# Patient Record
Sex: FEMALE | Race: WHITE | Employment: OTHER | URBAN - METROPOLITAN AREA
[De-identification: names, ages, dates, MRNs, and addresses within clinical notes are randomized per-mention and may not be internally consistent; named-entity substitution may affect disease eponyms.]

---

## 2017-04-11 ENCOUNTER — OFFICE VISIT (OUTPATIENT)
Dept: CARDIOLOGY CLINIC | Age: 72
End: 2017-04-11

## 2017-04-11 VITALS
DIASTOLIC BLOOD PRESSURE: 42 MMHG | HEART RATE: 55 BPM | SYSTOLIC BLOOD PRESSURE: 93 MMHG | BODY MASS INDEX: 21.53 KG/M2 | WEIGHT: 117 LBS | HEIGHT: 62 IN

## 2017-04-11 DIAGNOSIS — I34.0 NON-RHEUMATIC MITRAL REGURGITATION: Primary | ICD-10-CM

## 2017-04-11 DIAGNOSIS — E78.5 DYSLIPIDEMIA: ICD-10-CM

## 2017-04-11 DIAGNOSIS — I10 ESSENTIAL HYPERTENSION: ICD-10-CM

## 2017-04-11 RX ORDER — SIMVASTATIN 40 MG/1
20 TABLET, FILM COATED ORAL
COMMUNITY

## 2017-04-11 RX ORDER — SUMATRIPTAN 50 MG/1
50 TABLET, FILM COATED ORAL
COMMUNITY

## 2017-04-11 RX ORDER — NADOLOL 40 MG/1
TABLET ORAL DAILY
COMMUNITY

## 2017-04-11 NOTE — LETTER
Cyndi Vasiliy 1945 4/11/2017 Dear Janel Thompson MD 
 
I had the pleasure of evaluating  Ms. Jin Munoz in office today. Below are the relevant portions of my assessment and plan of care. ICD-10-CM ICD-9-CM 1. Non-rheumatic mitral regurgitation I34.0 424.0 2D ECHO COMPLETE ADULT (TTE) 2. Essential hypertension I10 401.9 3. Dyslipidemia E78.5 272.4 Current Outpatient Prescriptions Medication Sig Dispense Refill  simvastatin (ZOCOR) 40 mg tablet Take  by mouth nightly.  nadolol (CORGARD) 40 mg tablet Take  by mouth daily.  SUMAtriptan (IMITREX) 50 mg tablet Take 50 mg by mouth once as needed for Migraine. Orders Placed This Encounter  2D ECHO COMPLETE ADULT (TTE) Standing Status:   Future Standing Expiration Date:   10/11/2017 Order Specific Question:   Reason for Exam: Answer:   mitral regurgitation  simvastatin (ZOCOR) 40 mg tablet Sig: Take  by mouth nightly.  nadolol (CORGARD) 40 mg tablet Sig: Take  by mouth daily.  SUMAtriptan (IMITREX) 50 mg tablet Sig: Take 50 mg by mouth once as needed for Migraine. If you have questions, please do not hesitate to call me. I look forward to following Ms. Jin Munoz along with you.  
 
Sincerely, 
Emily Yoon MD

## 2017-04-11 NOTE — PROGRESS NOTES
HISTORY OF PRESENT ILLNESS  Ever Elders is a 73 y.o. female. New Patient   The history is provided by the patient. This is a chronic problem. The problem occurs every several days. The problem has not changed since onset. Pertinent negatives include no chest pain, no abdominal pain, no headaches and no shortness of breath. Valvular Heart Disease   The history is provided by the patient. This is a chronic problem. The problem occurs every several days. The problem has not changed since onset. Pertinent negatives include no chest pain, no abdominal pain, no headaches and no shortness of breath. Review of Systems   Constitutional: Negative for chills, diaphoresis, fever, malaise/fatigue and weight loss. HENT: Negative for ear discharge, ear pain, hearing loss, nosebleeds and tinnitus. Eyes: Negative for blurred vision. Respiratory: Negative for cough, hemoptysis, sputum production, shortness of breath, wheezing and stridor. Cardiovascular: Negative for chest pain, palpitations, orthopnea, claudication, leg swelling and PND. Gastrointestinal: Negative for abdominal pain, heartburn, nausea and vomiting. Musculoskeletal: Negative for myalgias and neck pain. Skin: Negative for itching and rash. Neurological: Negative for dizziness, tingling, tremors, focal weakness, loss of consciousness, weakness and headaches. Psychiatric/Behavioral: Negative for depression and suicidal ideas.      Family History   Problem Relation Age of Onset    Breast Cancer Mother 80    Hypertension Mother     Heart Surgery Mother     Hypertension Father        Past Medical History:   Diagnosis Date    Breast cancer ([facility] [address] lt mastectomy '92    per pt       Past Surgical History:   Procedure Laterality Date    HX BREAST AUGMENTATION  rt implant '89       Social History   Substance Use Topics    Smoking status: Never Smoker    Smokeless tobacco: Not on file    Alcohol use No       Allergies   Allergen Reactions    Diflucan [Fluconazole] Swelling     Mouth swelling    Codeine Nausea and Vomiting    Sulfa (Sulfonamide Antibiotics) Rash       Outpatient Prescriptions Marked as Taking for the 4/11/17 encounter (Office Visit) with Kamran Seymour MD   Medication Sig Dispense Refill    simvastatin (ZOCOR) 40 mg tablet Take  by mouth nightly.  nadolol (CORGARD) 40 mg tablet Take  by mouth daily.  SUMAtriptan (IMITREX) 50 mg tablet Take 50 mg by mouth once as needed for Migraine. Visit Vitals    BP 93/42    Pulse (!) 55    Ht 5' 2\" (1.575 m)    Wt 53.1 kg (117 lb)    BMI 21.4 kg/m2     Physical Exam   Constitutional: She is oriented to person, place, and time. She appears well-developed and well-nourished. No distress. HENT:   Head: Atraumatic. Mouth/Throat: No oropharyngeal exudate. Eyes: Conjunctivae are normal. Right eye exhibits no discharge. Left eye exhibits no discharge. No scleral icterus. Neck: Normal range of motion. Neck supple. No JVD present. No tracheal deviation present. No thyromegaly present. Cardiovascular: Normal rate and regular rhythm. Exam reveals no gallop. Murmur (2/6 holosystolic blowing murmur best heard at apex with radiation to axilla) heard. Pulmonary/Chest: Effort normal and breath sounds normal. No stridor. No respiratory distress. She has no wheezes. She has no rales. She exhibits no tenderness. Abdominal: Soft. There is no tenderness. There is no rebound and no guarding. Musculoskeletal: Normal range of motion. She exhibits no edema or tenderness. Lymphadenopathy:     She has no cervical adenopathy. Neurological: She is alert and oriented to person, place, and time. She exhibits normal muscle tone. Skin: Skin is warm. She is not diaphoretic. Psychiatric: She has a normal mood and affect.  Her behavior is normal.     ekg sinus rhythm with no acute st-t changes  ASSESSMENT and PLAN    ICD-10-CM ICD-9-CM    1. Non-rheumatic mitral regurgitation I34.0 424.0 2D ECHO COMPLETE ADULT (TTE)   2. Essential hypertension I10 401.9    3. Dyslipidemia E78.5 272.4      Orders Placed This Encounter    2D ECHO COMPLETE ADULT (TTE)     Standing Status:   Future     Standing Expiration Date:   10/11/2017     Order Specific Question:   Reason for Exam:     Answer:   mitral regurgitation     Follow-up Disposition:  Return in about 2 weeks (around 4/25/2017). current treatment plan is effective, no change in therapy  cardiovascular risk and specific lipid/LDL goals reviewed. 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 She claims to be asymptomatic with good ET. Will proceed with echo to assess LV function/MR. If needed, will proceed with ETT.

## 2017-04-11 NOTE — MR AVS SNAPSHOT
Visit Information Date & Time Provider Department Dept. Phone Encounter #  
 4/11/2017 11:45 AM Kamran Seymour MD [facility] [address] [place] [address] [id number] Follow-up Instructions Return in about 2 weeks (around 4/25/2017). Your Appointments 4/28/2017 10:00 AM  
PROCEDURE with CA ECHO [facility] ([facility]) Appt Note: saturnino; [employer] [address], [address] [facility] [address]  
[address], [address] [place] [address]  
  
    
 5/2/2017 10:00 AM  
ESTABLISHED PATIENT with Kamran Seymour MD  
[facility]) Appt Note: post echo [address], [address] [facility] [address]  
[address], [address] Upcoming Health Maintenance Date Due Hepatitis C Screening 1945 DTaP/Tdap/Td series (1 - Tdap) 9/1/1968 FOBT Q 1 YEAR AGE 50-75 9/1/1997 ZOSTER VACCINE AGE 60> 9/1/2007 GLAUCOMA SCREENING Q2Y 9/1/2012 OSTEOPOROSIS SCREENING (DEXA) 9/1/2012 Pneumococcal 65+ Low/Medium Risk (1 of 2 - PCV13) 9/1/2012 MEDICARE YEARLY EXAM 9/1/2012 BREAST CANCER SCRN MAMMOGRAM 8/20/2014 INFLUENZA AGE 9 TO ADULT 8/1/2016 Allergies as of 4/11/2017  Review Complete On: 4/11/2017 By: Angie Pierre LPN Severity Noted Reaction Type Reactions Diflucan [Fluconazole] High 06/10/2014   Systemic Swelling Mouth swelling Codeine Low 06/10/2014   Side Effect Nausea and Vomiting  
 Sulfa (Sulfonamide Antibiotics) Low 06/10/2014   Topical Rash Current Immunizations  Never Reviewed No immunizations on file. Not reviewed this visit You Were Diagnosed With   
  
 Codes Comments Non-rheumatic mitral regurgitation    -  Primary ICD-10-CM: I34.0 ICD-9-CM: 424.0 Essential hypertension     ICD-10-CM: I10 
ICD-9-CM: 401.9 Dyslipidemia     ICD-10-CM: E78.5 ICD-9-CM: 272.4 Vitals BP Pulse Height(growth percentile) Weight(growth percentile) BMI Smoking Status 93/42 (!) 55 5' 2\" (1.575 m) 117 lb (53.1 kg) 21.4 kg/m2 Never Smoker Vitals History BMI and BSA Data Body Mass Index Body Surface Area  
 21.4 kg/m 2 1.52 m 2 Your Updated Medication List  
  
   
This list is accurate as of: 4/11/17 12:41 PM.  Always use your most recent med list.  
  
  
  
  
 IMITREX 50 mg tablet Generic drug:  SUMAtriptan Take 50 mg by mouth once as needed for Migraine. nadolol 40 mg tablet Commonly known as:  CORGARD Take  by mouth daily. ZOCOR 40 mg tablet Generic drug:  simvastatin Take  by mouth nightly. Follow-up Instructions Return in about 2 weeks (around 4/25/2017). To-Do List   
 04/25/2017 Cardiac Services:  2D ECHO COMPLETE ADULT (TTE) Introducing [facility] & HEALTH SERVICES! Jian Us introduces [email or web address] patient portal. Now you can access parts of your medical record, email your doctor's office, and request medication refills online. 1. In your internet browser, go to [email or web address] 2. Click on the First Time User? Click Here link in the Sign In box. You will see the New Member Sign Up page. 3. Enter your [email or web address] Access Code exactly as it appears below. You will not need to use this code after youve completed the sign-up process. If you do not sign up before the expiration date, you must request a new code. · [email or web address] Access Code: [email or web address] Expires: 7/10/2017 11:55 AM 
 
4. Enter the last four digits of your Social Security Number (xxxx) and Date of Birth (mm/dd/yyyy) as indicated and click Submit. You will be taken to the next sign-up page. 5. Create a BeVocalt ID. This will be your BeVocalt login ID and cannot be changed, so think of one that is secure and easy to remember. 6. Create a BeVocalt password. You can change your password at any time. 7. Enter your Password Reset Question and Answer. This can be used at a later time if you forget your password. 8. Enter your e-mail address. You will receive e-mail notification when new information is available in [address] 9. Click Sign Up. You can now view and download portions of your medical record. 10. Click the Download Summary menu link to download a portable copy of your medical information. If you have questions, please visit the Frequently Asked Questions section of the Vivid [employer] website. Remember, V[employer] is NOT to be used for urgent needs. For medical emergencies, dial 911. Now available from your iPhone and Android! Please provide this summary of care documentation to your next provider. Your primary care clinician is listed as Erwin Jimenez. If you have any questions after today's visit, please call [phone].

## 2017-04-28 ENCOUNTER — CLINICAL SUPPORT (OUTPATIENT)
Dept: CARDIOLOGY CLINIC | Age: 72
End: 2017-04-28

## 2017-04-28 DIAGNOSIS — I34.0 NON-RHEUMATIC MITRAL REGURGITATION: ICD-10-CM

## 2017-05-02 ENCOUNTER — OFFICE VISIT (OUTPATIENT)
Dept: CARDIOLOGY CLINIC | Age: 72
End: 2017-05-02

## 2017-05-02 VITALS
BODY MASS INDEX: 21.53 KG/M2 | WEIGHT: 117 LBS | SYSTOLIC BLOOD PRESSURE: 97 MMHG | DIASTOLIC BLOOD PRESSURE: 48 MMHG | HEART RATE: 59 BPM | HEIGHT: 62 IN

## 2017-05-02 DIAGNOSIS — I10 ESSENTIAL HYPERTENSION: ICD-10-CM

## 2017-05-02 DIAGNOSIS — I34.0 NON-RHEUMATIC MITRAL REGURGITATION: Primary | ICD-10-CM

## 2017-05-02 DIAGNOSIS — E78.5 DYSLIPIDEMIA: ICD-10-CM

## 2017-05-02 DIAGNOSIS — I34.1 MITRAL VALVE PROLAPSE: ICD-10-CM

## 2017-05-02 RX ORDER — TRAZODONE HYDROCHLORIDE 50 MG/1
TABLET ORAL
Status: ON HOLD | COMMUNITY
End: 2018-09-20

## 2017-05-02 RX ORDER — DULOXETIN HYDROCHLORIDE 30 MG/1
30 CAPSULE, DELAYED RELEASE ORAL DAILY
COMMUNITY

## 2017-05-02 NOTE — PROGRESS NOTES
HISTORY OF PRESENT ILLNESS  Vlad Griffin is a 73 y.o. female. Valvular Heart Disease   The history is provided by the patient. This is a chronic problem. The problem occurs every several days. The problem has not changed since onset. Pertinent negatives include no chest pain and no shortness of breath. Review of Systems   Constitutional: Negative for chills, diaphoresis, fever, malaise/fatigue and weight loss. HENT: Negative for ear discharge, ear pain, hearing loss, nosebleeds and tinnitus. Eyes: Negative for blurred vision. Respiratory: Negative for cough, hemoptysis, sputum production, shortness of breath, wheezing and stridor. Cardiovascular: Negative for chest pain, palpitations, orthopnea, claudication, leg swelling and PND. Gastrointestinal: Negative for heartburn, nausea and vomiting. Musculoskeletal: Negative for myalgias and neck pain. Skin: Negative for itching and rash. Neurological: Negative for dizziness, tingling, tremors, focal weakness, loss of consciousness and weakness. Psychiatric/Behavioral: Negative for depression and suicidal ideas.      Family History   Problem Relation Age of Onset    Breast Cancer Mother 80    Hypertension Mother     Heart Surgery Mother     Hypertension Father        Past Medical History:   Diagnosis Date    Breast cancer ([facility] [address] lt mastectomy '92    per pt       Past Surgical History:   Procedure Laterality Date    HX BREAST AUGMENTATION  rt implant '89       Social History   Substance Use Topics    Smoking status: Never Smoker    Smokeless tobacco: Not on file    Alcohol use No       Allergies   Allergen Reactions    Diflucan [Fluconazole] Swelling     Mouth swelling    Codeine Nausea and Vomiting    Sulfa (Sulfonamide Antibiotics) Rash       Outpatient Prescriptions Marked as Taking for the 5/2/17 encounter (Office Visit) with Chito Mallory MD   Medication Sig Dispense Refill    traZODone (DESYREL) 50 mg tablet Take  by mouth nightly.  DULoxetine (CYMBALTA) 30 mg capsule Take 30 mg by mouth daily.  BUTALB/ACETAMINOPHEN/CAFFEINE (FIORICET PO) Take  by mouth.  simvastatin (ZOCOR) 40 mg tablet Take  by mouth nightly.  nadolol (CORGARD) 40 mg tablet Take  by mouth daily.  SUMAtriptan (IMITREX) 50 mg tablet Take 50 mg by mouth once as needed for Migraine. Visit Vitals    BP 97/48    Pulse (!) 59    Ht 5' 2\" (1.575 m)    Wt 53.1 kg (117 lb)    BMI 21.4 kg/m2     Physical Exam   Constitutional: She is oriented to person, place, and time. She appears well-developed and well-nourished. No distress. HENT:   Head: Atraumatic. Mouth/Throat: No oropharyngeal exudate. Eyes: Conjunctivae are normal. Right eye exhibits no discharge. Left eye exhibits no discharge. No scleral icterus. Neck: Normal range of motion. Neck supple. No JVD present. No tracheal deviation present. No thyromegaly present. Cardiovascular: Normal rate and regular rhythm. Exam reveals no gallop. Murmur (2/6 holosystolic blowing murmur best heard at apex with radiation to axilla) heard. Pulmonary/Chest: Effort normal and breath sounds normal. No stridor. No respiratory distress. She has no wheezes. She has no rales. She exhibits no tenderness. Abdominal: Soft. There is no tenderness. There is no rebound and no guarding. Musculoskeletal: Normal range of motion. She exhibits no edema or tenderness. Lymphadenopathy:     She has no cervical adenopathy. Neurological: She is alert and oriented to person, place, and time. She exhibits normal muscle tone. Skin: Skin is warm. She is not diaphoretic. Psychiatric: She has a normal mood and affect. Her behavior is normal.     ekg sinus rhythm with no acute st-t changes. Echo 04/2017:  SUMMARY:  Left ventricle: Systolic function was normal. Ejection fraction was  estimated in the range of 55 % to 60 %. There were no regional wall motion  abnormalities.     Left atrium: The atrium was moderately dilated. Mitral valve: There was mild annular calcification. There was a moderate  prolapse involving the anterior and posterior leaflets. There was moderate  to severe regurgitation. The effective orifice of mitral regurgitation by  proximal isovelocity surface area was 0.13 cmï¾². The volume of mitral  regurgitation by proximal isovelocity surface area was 23 ml. ASSESSMENT and PLAN    ICD-10-CM ICD-9-CM    1. Non-rheumatic mitral regurgitation I34.0 424.0 STRESS TEST CARDIAC    moderate to severe   2. Essential hypertension I10 401.9    3. Dyslipidemia E78.5 272.4    4. Mitral valve prolapse I34.1 424.0      Orders Placed This Encounter    STRESS TEST CARDIAC     Standing Status:   Future     Standing Expiration Date:   11/2/2017     Order Specific Question:   Reason for Exam:     Answer:   mitral regurgitation     Follow-up Disposition:  Return in about 3 weeks (around 5/23/2017). current treatment plan is effective, no change in therapy  cardiovascular risk and specific lipid/LDL goals reviewed. 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 She claims to be asymptomatic with good ET. Echo report reviewed with patient- will proceed with ETT to evaluate exercise tolerance.

## 2017-05-02 NOTE — MR AVS SNAPSHOT
Visit Information Date & Time Provider Department Dept. Phone Encounter #  
 5/2/2017 10:00 AM Bakari Xie MD Cardiology [facility] [address] HEATHER RAMÍREZ [phone] [id number] Follow-up Instructions Return in about 3 weeks (around 5/23/2017). Follow-up and Disposition History Upcoming Health Maintenance Date Due Hepatitis C Screening 1945 DTaP/Tdap/Td series (1 - Tdap) 9/1/1968 FOBT Q 1 YEAR AGE 50-75 9/1/1997 ZOSTER VACCINE AGE 60> 9/1/2007 GLAUCOMA SCREENING Q2Y 9/1/2012 OSTEOPOROSIS SCREENING (DEXA) 9/1/2012 Pneumococcal 65+ Low/Medium Risk (1 of 2 - PCV13) 9/1/2012 MEDICARE YEARLY EXAM 9/1/2012 BREAST CANCER SCRN MAMMOGRAM 8/20/2014 INFLUENZA AGE 9 TO ADULT 8/1/2017 Allergies as of 5/2/2017  Review Complete On: 5/2/2017 By: Bakari Xie MD  
  
 Severity Noted Reaction Type Reactions Diflucan [Fluconazole] High 06/10/2014   Systemic Swelling Mouth swelling Codeine Low 06/10/2014   Side Effect Nausea and Vomiting  
 Sulfa (Sulfonamide Antibiotics) Low 06/10/2014   Topical Rash Current Immunizations  Never Reviewed No immunizations on file. Not reviewed this visit You Were Diagnosed With   
  
 Codes Comments Non-rheumatic mitral regurgitation    -  Primary ICD-10-CM: I34.0 ICD-9-CM: 424.0 moderate to severe Essential hypertension     ICD-10-CM: I10 
ICD-9-CM: 401.9 Dyslipidemia     ICD-10-CM: E78.5 ICD-9-CM: 272.4 Mitral valve prolapse     ICD-10-CM: I34.1 ICD-9-CM: 424.0 Vitals BP Pulse Height(growth percentile) Weight(growth percentile) BMI Smoking Status 97/48 (!) 59 5' 2\" (1.575 m) 117 lb (53.1 kg) 21.4 kg/m2 Never Smoker Vitals History BMI and BSA Data Body Mass Index Body Surface Area  
 21.4 kg/m 2 1.52 m 2 Your Updated Medication List  
  
   
This list is accurate as of: 5/2/17 11:11 AM.  Always use your most recent med list.  
  
  
  
  
 CYMBALTA 30 mg capsule Generic drug:  DULoxetine Take 30 mg by mouth daily. FIORICET PO Take  by mouth. IMITREX 50 mg tablet Generic drug:  SUMAtriptan Take 50 mg by mouth once as needed for Migraine. nadolol 40 mg tablet Commonly known as:  CORGARD Take  by mouth daily. traZODone 50 mg tablet Commonly known as:  Adan Cushing Take  by mouth nightly. ZOCOR 40 mg tablet Generic drug:  simvastatin Take  by mouth nightly. Follow-up Instructions Return in about 3 weeks (around 5/23/2017). To-Do List   
 05/23/2017 ECG:  STRESS TEST CARDIAC Introducing [facility]! Katie Brandy introduces [employer] patient portal. Now you can access parts of your medical record, email your doctor's office, and request medication refills online. 1. In your internet browser, go to [email or web address] 2. Click on the First Time User? Click Here link in the Sign In box. You will see the New Member Sign Up page. 3. Enter your [employer] Access Code exactly as it appears below. You will not need to use this code after youve completed the sign-up process. If you do not sign up before the expiration date, you must request a new code. · [employer] Access Code: [email or web address] Expires: 7/10/2017 11:55 AM 
 
4. Enter the last four digits of your Social Security Number (xxxx) and Date of Birth (mm/dd/yyyy) as indicated and click Submit. You will be taken to the next sign-up page. 5. Create a Bohemian Guitarst ID. This will be your Flo Water login ID and cannot be changed, so think of one that is secure and easy to remember. 6. Create a Flo Water password. You can change your password at any time. 7. Enter your Password Reset Question and Answer. This can be used at a later time if you forget your password. 8. Enter your e-mail address. You will receive e-mail notification when new information is available in [address] 9. Click Sign Up. You can now view and download portions of your medical record. 10. Click the Download Summary menu link to download a portable copy of your medical information. If you have questions, please visit the Frequently Asked Questions section of the [employer] website. Remember, [employer] is NOT to be used for urgent needs. For medical emergencies, dial 911. Now available from your iPhone and Android! Please provide this summary of care documentation to your next provider. Your primary care clinician is listed as Ede Woodward. If you have any questions after today's visit, please call [phone].

## 2017-05-02 NOTE — PROGRESS NOTES
4/28/17  Echocardiogram     SUMMARY:  Left ventricle: Systolic function was normal. Ejection fraction was  estimated in the range of 55 % to 60 %. There were no regional wall motion  abnormalities. Left atrium: The atrium was moderately dilated. Mitral valve: There was mild annular calcification. There was a moderate  prolapse involving the anterior and posterior leaflets. There was moderate  to severe regurgitation. The effective orifice of mitral regurgitation by  proximal isovelocity surface area was 0.13 cmï¾². The volume of mitral  regurgitation by proximal isovelocity surface area was 23 ml. Tricuspid valve: There was mild regurgitation. Pulmonic valve: There was mild regurgitation. COMPARISONS:  Comparison was made with the previous study of 28-Oct-2015. Mitral  regurgitation has improved slightly from severe to moderate-severe. Left  atrium has increased in size. 1. Have you been to the ER, urgent care clinic since your last visit? Hospitalized since your last visit? NO    2. Have you seen or consulted any other health care providers outside of the [address] [place] [address] since your last visit? Include any pap smears or colon screening. NO    3. Since your last visit, have you had any of the following symptoms? None   4. Have you had any blood work, X-rays or cardiac testing? None     5. Where do you normally have your labs drawn? pcp     6. Do you need any refills today?  No  Medications confirmed verbally by patient

## 2017-05-02 NOTE — LETTER
Geovany Ángel 1945 5/2/2017 Dear Isidra Healy MD 
 
I had the pleasure of evaluating  Ms. Dante Lopez in office today. Below are the relevant portions of my assessment and plan of care. ICD-10-CM ICD-9-CM 1. Non-rheumatic mitral regurgitation I34.0 424.0 STRESS TEST CARDIAC  
 moderate to severe 2. Essential hypertension I10 401.9 3. Dyslipidemia E78.5 272.4 4. Mitral valve prolapse I34.1 424.0 Current Outpatient Prescriptions Medication Sig Dispense Refill  traZODone (DESYREL) 50 mg tablet Take  by mouth nightly.  DULoxetine (CYMBALTA) 30 mg capsule Take 30 mg by mouth daily.  BUTALB/ACETAMINOPHEN/CAFFEINE (FIORICET PO) Take  by mouth.  simvastatin (ZOCOR) 40 mg tablet Take  by mouth nightly.  nadolol (CORGARD) 40 mg tablet Take  by mouth daily.  SUMAtriptan (IMITREX) 50 mg tablet Take 50 mg by mouth once as needed for Migraine. Orders Placed This Encounter  STRESS TEST CARDIAC Standing Status:   Future Standing Expiration Date:   11/2/2017 Order Specific Question:   Reason for Exam: Answer:   mitral regurgitation  traZODone (DESYREL) 50 mg tablet Sig: Take  by mouth nightly.  DULoxetine (CYMBALTA) 30 mg capsule Sig: Take 30 mg by mouth daily.  BUTALB/ACETAMINOPHEN/CAFFEINE (FIORICET PO) Sig: Take  by mouth. If you have questions, please do not hesitate to call me. I look forward to following Ms. Dante Lopez along with you.  
 
Sincerely, 
Verónica Toribio MD

## 2017-08-11 ENCOUNTER — CLINICAL SUPPORT (OUTPATIENT)
Dept: CARDIOLOGY CLINIC | Age: 72
End: 2017-08-11

## 2017-08-11 DIAGNOSIS — I34.0 NON-RHEUMATIC MITRAL REGURGITATION: ICD-10-CM

## 2017-08-11 NOTE — LETTER
Samantha De La Paz 1945 8/11/2017 Dear Nicolás Mensah MD 
 
I had the pleasure of evaluating  Ms. Jovon Myles in office today. Below are the relevant portions of my assessment and plan of care. ICD-10-CM ICD-9-CM 1. Non-rheumatic mitral regurgitation I34.0 424.0 AMB POC CARDIAC STRESS TST,COMPLETE  
   CANCELED: STRESS TEST CARDIAC  
 moderate to severe Current Outpatient Prescriptions Medication Sig Dispense Refill  traZODone (DESYREL) 50 mg tablet Take  by mouth nightly.  DULoxetine (CYMBALTA) 30 mg capsule Take 30 mg by mouth daily.  BUTALB/ACETAMINOPHEN/CAFFEINE (FIORICET PO) Take  by mouth.  simvastatin (ZOCOR) 40 mg tablet Take  by mouth nightly.  nadolol (CORGARD) 40 mg tablet Take  by mouth daily.  SUMAtriptan (IMITREX) 50 mg tablet Take 50 mg by mouth once as needed for Migraine. No orders of the defined types were placed in this encounter. If you have questions, please do not hesitate to call me. I look forward to following Ms. Jovon Myles along with you.  
 
Sincerely, 
Lynn Buenrostro MD

## 2017-08-11 NOTE — PROGRESS NOTES
[facility], [address], [address] [place] [address], [address] [place] [address]          Treadmill  Stress Test      Name: Tiffanie Bradley    Age: 73 y.o. Gender: female  YOB: 1945   Date of Rest/Stress Images: 8/11/2017   Diagnosis: No diagnosis found. Referring Provider: Jennifer Esparza MD  Ordering Provider: Dr. Sunday Ochoa          Baseline:   Heart rate 78. Blood pressure 138/76. EKG shows sinus rhythm, normal axis, nonspecific ST-T changes. Exercise data:  Patient exercised using standard Jayme protocol. Patient exercised for a total of 6 minutes and 33 seconds achieving 7.0 METS. Exercise was stopped due to fatigue at patient's request.  Max heart rate is 142 which is 94 of predicted maximal.  Max BP is 160/66. EKG has no ST-T changes by standard criteria. Conclusions :  1. No ischemic ST-T changes up to 94% of predicted maximum heart rate. 2.  Normal functional capacity. 3.  No symptoms or arrhythmias with exercise. 4.  Appropriate heart rate and blood pressure response. 5.  Clinical correlation is suggested.         Thank you for the referral.    Interpreting Physician:  Dr. Sunday Ochoa    8/11/2017

## 2017-08-11 NOTE — MR AVS SNAPSHOT
Visit Information Date & Time Provider Department Dept. Phone Encounter #  
 8/11/2017  1:45 PM Jaime Dodson MD [facility] [address] [place] [address] [id number] Follow-up Instructions Return in about 4 months (around 12/11/2017). Your Appointments 8/11/2017  1:45 PM  
PROCEDURE with Jaime Dodson MD  
[facility]) Appt Note: post nate; stress test with f/u; swp; stress test with f/u l/m on machine; stress test with f/u 11:30am  
 [address] [place] [address]  
[address], [address] [facility]  
  
    
 12/12/2017 11:45 AM  
Office Visit with Jaime Dodson MD  
[facility]) Appt Note: 4 m [address] [place] [address]  
[address], [address] Upcoming Health Maintenance Date Due Hepatitis C Screening 1945 DTaP/Tdap/Td series (1 - Tdap) 9/1/1968 FOBT Q 1 YEAR AGE 50-75 9/1/1997 ZOSTER VACCINE AGE 60> 7/1/2007 GLAUCOMA SCREENING Q2Y 9/1/2012 OSTEOPOROSIS SCREENING (DEXA) 9/1/2012 Pneumococcal 65+ Low/Medium Risk (1 of 2 - PCV13) 9/1/2012 MEDICARE YEARLY EXAM 9/1/2012 BREAST CANCER SCRN MAMMOGRAM 8/20/2014 INFLUENZA AGE 9 TO ADULT 8/1/2017 Allergies as of 8/11/2017  Review Complete On: 8/11/2017 By: Enrike Vargas LPN Severity Noted Reaction Type Reactions Diflucan [Fluconazole] High 06/10/2014   Systemic Swelling Mouth swelling Codeine Low 06/10/2014   Side Effect Nausea and Vomiting  
 Sulfa (Sulfonamide Antibiotics) Low 06/10/2014   Topical Rash Current Immunizations  Never Reviewed No immunizations on file. Not reviewed this visit You Were Diagnosed With   
  
 Codes Comments Non-rheumatic mitral regurgitation     ICD-10-CM: I34.0 ICD-9-CM: 424.0 moderate to severe Vitals Smoking Status Never Smoker Your Updated Medication List  
  
   
This list is accurate as of: 8/11/17  1:06 PM.  Always use your most recent med list.  
  
  
  
  
 CYMBALTA 30 mg capsule Generic drug:  DULoxetine Take 30 mg by mouth daily. FIORICET PO Take  by mouth. IMITREX 50 mg tablet Generic drug:  SUMAtriptan Take 50 mg by mouth once as needed for Migraine. nadolol 40 mg tablet Commonly known as:  CORGARD Take  by mouth daily. traZODone 50 mg tablet Commonly known as:  [place] Odor Take  by mouth nightly. ZOCOR 40 mg tablet Generic drug:  simvastatin Take  by mouth nightly. Follow-up Instructions Return in about 4 months (around 12/11/2017). Introducing [facility] & HEALTH SERVICES! [facility] introduces [employer] patient portal. Now you can access parts of your medical record, email your doctor's office, and request medication refills online. 1. In your internet browser, go to [email or web address] 2. Click on the First Time User? Click Here link in the Sign In box. You will see the New Member Sign Up page. 3. Enter your [employer] Access Code exactly as it appears below. You will not need to use this code after youve completed the sign-up process. If you do not sign up before the expiration date, you must request a new code. · [employer] Access Code: [email or web address] Expires: 11/9/2017  1:06 PM 
 
4. Enter the last four digits of your Social Security Number (xxxx) and Date of Birth (mm/dd/yyyy) as indicated and click Submit. You will be taken to the next sign-up page. 5. Create a [employer] ID. This will be your [employer] login ID and cannot be changed, so think of one that is secure and easy to remember. 6. Create a [employer] password. You can change your password at any time. 7. Enter your Password Reset Question and Answer.  This can be used at a later time if you forget your password. 8. Enter your e-mail address. You will receive e-mail notification when new information is available in [address] 9. Click Sign Up. You can now view and download portions of your medical record. 10. Click the Download Summary menu link to download a portable copy of your medical information. If you have questions, please visit the Frequently Asked Questions section of the [employer] website. Remember, [employer] is NOT to be used for urgent needs. For medical emergencies, dial 911. Now available from your iPhone and Android! Please provide this summary of care documentation to your next provider. Your primary care clinician is listed as Rao De La Torre. If you have any questions after today's visit, please call [phone].

## 2018-07-12 ENCOUNTER — OFFICE VISIT (OUTPATIENT)
Dept: CARDIOLOGY CLINIC | Age: 73
End: 2018-07-12

## 2018-07-12 VITALS
HEIGHT: 62 IN | HEART RATE: 56 BPM | SYSTOLIC BLOOD PRESSURE: 115 MMHG | BODY MASS INDEX: 22.45 KG/M2 | DIASTOLIC BLOOD PRESSURE: 52 MMHG | WEIGHT: 122 LBS

## 2018-07-12 DIAGNOSIS — I34.1 MITRAL VALVE PROLAPSE: ICD-10-CM

## 2018-07-12 DIAGNOSIS — I10 ESSENTIAL HYPERTENSION: ICD-10-CM

## 2018-07-12 DIAGNOSIS — I34.0 NON-RHEUMATIC MITRAL REGURGITATION: Primary | ICD-10-CM

## 2018-07-12 DIAGNOSIS — E78.5 DYSLIPIDEMIA: ICD-10-CM

## 2018-07-12 RX ORDER — ZOLPIDEM TARTRATE 5 MG/1
2.5 TABLET ORAL
COMMUNITY

## 2018-07-12 NOTE — PROGRESS NOTES
1. Have you been to the ER, urgent care clinic since your last visit? Hospitalized since your last visit?     no  2. Have you seen or consulted any other health care providers outside of the [address] [place] [address] since your last visit? Include any pap smears or colon screening. Yes Where: pcp     3. Since your last visit, have you had any of the following symptoms?      dizziness.

## 2018-07-12 NOTE — MR AVS SNAPSHOT
[address] [facility] 
 
 
 [address] [facility], [address] [facility] [address] 
[phone] Patient: Jannet Bingham MRN: [id number] PWL:[birth year] Visit Information Date & Time Provider Department Dept. Phone Encounter #  
 7/12/2018  1:15 PM Samuel Khan MD [facility] [address] [place] [address] [id number] Follow-up Instructions Return in about 2 months (around 9/12/2018). Follow-up and Disposition History Your Appointments 7/18/2018 10:45 AM  
PROCEDURE with [place] E[facility] [place] (Stacy Ruiz) Appt Note: echo saturnino [address] [facility], [address] [facility] [address]  
[phone]  
  
   
 [address] [facility], [address] [place] [address] Road [address]  
  
    
 9/6/2018  1:30 PM  
PROCEDURE with Samuel Khan MD  
[facility]) Appt Note: 2 m post echo [address] [facility], [address] [facility] [address]  
1338 [facility], [address] Upcoming Health Maintenance Date Due Hepatitis C Screening 1945 DTaP/Tdap/Td series (1 - Tdap) 9/1/1968 FOBT Q 1 YEAR AGE 50-75 9/1/1997 ZOSTER VACCINE AGE 60> 7/1/2007 GLAUCOMA SCREENING Q2Y 9/1/2012 Bone Densitometry (Dexa) Screening 9/1/2012 Pneumococcal 65+ Low/Medium Risk (1 of 2 - PCV13) 9/1/2012 BREAST CANCER SCRN MAMMOGRAM 8/20/2014 Influenza Age 5 to Adult 8/1/2018 Allergies as of 7/12/2018  Review Complete On: 7/12/2018 By: Rashida Dennison Severity Noted Reaction Type Reactions Diflucan [Fluconazole] High 06/10/2014   Systemic Swelling Mouth swelling Codeine Low 06/10/2014   Side Effect Nausea and Vomiting  
 Sulfa (Sulfonamide Antibiotics) Low 06/10/2014   Topical Rash Current Immunizations  Never Reviewed No immunizations on file. Not reviewed this visit You Were Diagnosed With   
  
 Codes Comments Non-rheumatic mitral regurgitation    -  Primary ICD-10-CM: I34.0 ICD-9-CM: 424.0 Essential hypertension     ICD-10-CM: I10 
ICD-9-CM: 401.9 Dyslipidemia     ICD-10-CM: E78.5 ICD-9-CM: 272.4 Mitral valve prolapse     ICD-10-CM: I34.1 ICD-9-CM: 424.0 Vitals BP Pulse Height(growth percentile) Weight(growth percentile) BMI Smoking Status 115/52 (!) 56 5' 2\" (1.575 m) 122 lb (55.3 kg) 22.31 kg/m2 Never Smoker Vitals History BMI and BSA Data Body Mass Index Body Surface Area  
 22.31 kg/m 2 1.56 m 2 Your Updated Medication List  
  
   
This list is accurate as of 7/12/18  2:06 PM.  Always use your most recent med list.  
  
  
  
  
 AMBIEN 5 mg tablet Generic drug:  zolpidem Take 2.5 mg by mouth nightly as needed for Sleep. CYMBALTA 30 mg capsule Generic drug:  DULoxetine Take 30 mg by mouth daily. FIORICET PO Take  by mouth. IMITREX 50 mg tablet Generic drug:  SUMAtriptan Take 50 mg by mouth once as needed for Migraine. nadolol 40 mg tablet Commonly known as:  CORGARD Take  by mouth daily. traZODone 50 mg tablet Commonly known as:  Rebbecca Bunde Take  by mouth nightly. ZOCOR 40 mg tablet Generic drug:  simvastatin Take 20 mg by mouth nightly. Follow-up Instructions Return in about 2 months (around 9/12/2018). To-Do List   
 08/31/2018 Echocardiography:  ECHO ADULT COMPLETE Introducing [facility] & HEALTH SERVICES! [facility] introduces [employer] patient portal. Now you can access parts of your medical record, email your doctor's office, and request medication refills online. 1. In your internet browser, go to [email or web address] 2. Click on the First Time User? Click Here link in the Sign In box. You will see the New Member Sign Up page. 3. Enter your [employer] Access Code exactly as it appears below.  You will not need to use this code after youve completed the sign-up process. If you do not sign up before the expiration date, you must request a new code. · [employer] Access Code: [email or web address] Expires: 10/10/2018  1:32 PM 
 
4. Enter the last four digits of your Social Security Number (xxxx) and Date of Birth (mm/dd/yyyy) as indicated and click Submit. You will be taken to the next sign-up page. 5. Create a [employer] ID. This will be your [employer] login ID and cannot be changed, so think of one that is secure and easy to remember. 6. Create a [employer] password. You can change your password at any time. 7. Enter your Password Reset Question and Answer. This can be used at a later time if you forget your password. 8. Enter your e-mail address. You will receive e-mail notification when new information is available in [address] 9. Click Sign Up. You can now view and download portions of your medical record. 10. Click the Download Summary menu link to download a portable copy of your medical information. If you have questions, please visit the Frequently Asked Questions section of the [employer] website. Remember, [employer] is NOT to be used for urgent needs. For medical emergencies, dial 911. Now available from your iPhone and Android! Please provide this summary of care documentation to your next provider. Your primary care clinician is listed as PROVIDER UNKNOWN. If you have any questions after today's visit, please call [phone].

## 2018-07-12 NOTE — PROGRESS NOTES
HISTORY OF PRESENT ILLNESS  Win Ramos is a 81 y.o. female. Valvular Heart Disease   The history is provided by the patient. This is a chronic problem. The problem occurs every several days. The problem has not changed since onset. Pertinent negatives include no chest pain and no shortness of breath. Hypertension   Pertinent negatives include no chest pain and no shortness of breath. Review of Systems   Constitutional: Negative for chills, diaphoresis, fever, malaise/fatigue and weight loss. HENT: Negative for ear discharge, ear pain, hearing loss, nosebleeds and tinnitus. Eyes: Negative for blurred vision. Respiratory: Negative for cough, hemoptysis, sputum production, shortness of breath, wheezing and stridor. Cardiovascular: Negative for chest pain, palpitations, orthopnea, claudication, leg swelling and PND. Gastrointestinal: Negative for heartburn, nausea and vomiting. Musculoskeletal: Negative for myalgias and neck pain. Skin: Negative for itching and rash. Neurological: Negative for dizziness, tingling, tremors, focal weakness, loss of consciousness and weakness. Psychiatric/Behavioral: Negative for depression and suicidal ideas.      Family History   Problem Relation Age of Onset    Breast Cancer Mother 80    Hypertension Mother     Heart Surgery Mother     Hypertension Father        Past Medical History:   Diagnosis Date    Breast cancer ([facility] [address] lt mastectomy '92    per pt       Past Surgical History:   Procedure Laterality Date    HX BREAST AUGMENTATION  rt implant '89       Social History   Substance Use Topics    Smoking status: Never Smoker    Smokeless tobacco: Never Used    Alcohol use No       Allergies   Allergen Reactions    Diflucan [Fluconazole] Swelling     Mouth swelling    Codeine Nausea and Vomiting    Sulfa (Sulfonamide Antibiotics) Rash       Outpatient Prescriptions Marked as Taking for the 7/12/18 encounter (Office Visit) with Samuel Adames MD   Medication Sig Dispense Refill    zolpidem (AMBIEN) 5 mg tablet Take 2.5 mg by mouth nightly as needed for Sleep.  DULoxetine (CYMBALTA) 30 mg capsule Take 30 mg by mouth daily.  simvastatin (ZOCOR) 40 mg tablet Take 20 mg by mouth nightly.  nadolol (CORGARD) 40 mg tablet Take  by mouth daily.  SUMAtriptan (IMITREX) 50 mg tablet Take 50 mg by mouth once as needed for Migraine. Visit Vitals    /52    Pulse (!) 56    Ht 5' 2\" (1.575 m)    Wt 55.3 kg (122 lb)    BMI 22.31 kg/m2     Physical Exam   Constitutional: She is oriented to person, place, and time. She appears well-developed and well-nourished. No distress. HENT:   Head: Atraumatic. Mouth/Throat: No oropharyngeal exudate. Eyes: Conjunctivae are normal. Right eye exhibits no discharge. Left eye exhibits no discharge. No scleral icterus. Neck: Normal range of motion. Neck supple. No JVD present. No tracheal deviation present. No thyromegaly present. Cardiovascular: Normal rate and regular rhythm. Exam reveals no gallop. Murmur (2/6 holosystolic blowing murmur best heard at apex with radiation to axilla) heard. Pulmonary/Chest: Effort normal and breath sounds normal. No stridor. No respiratory distress. She has no wheezes. She has no rales. She exhibits no tenderness. Abdominal: Soft. There is no tenderness. There is no rebound and no guarding. Musculoskeletal: Normal range of motion. She exhibits no edema or tenderness. Lymphadenopathy:     She has no cervical adenopathy. Neurological: She is alert and oriented to person, place, and time. She exhibits normal muscle tone. Skin: Skin is warm. She is not diaphoretic. Psychiatric: She has a normal mood and affect. Her behavior is normal.     ekg sinus rhythm with no acute st-t changes. Echo 04/2017:  SUMMARY:  Left ventricle: Systolic function was normal. Ejection fraction was  estimated in the range of 55 % to 60 %.  There were no regional wall motion  abnormalities. Left atrium: The atrium was moderately dilated. Mitral valve: There was mild annular calcification. There was a moderate  prolapse involving the anterior and posterior leaflets. There was moderate  to severe regurgitation. The effective orifice of mitral regurgitation by  proximal isovelocity surface area was 0.13 cmï¾². The volume of mitral  regurgitation by proximal isovelocity surface area was 23 ml. ASSESSMENT and PLAN    ICD-10-CM ICD-9-CM    1. Non-rheumatic mitral regurgitation I34.0 424.0 ECHO ADULT COMPLETE   2. Essential hypertension I10 401.9    3. Dyslipidemia E78.5 272.4    4. Mitral valve prolapse I34.1 424.0      No orders of the defined types were placed in this encounter. Follow-up Disposition:  Return in about 2 months (around 9/12/2018). current treatment plan is effective, no change in therapy  cardiovascular risk and specific lipid/LDL goals reviewed. 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 She claims to be asymptomatic with good ET.  ETT done last year- showed good functional capacity.   Will obtain echo to assess VHD  F/u in 2 months

## 2018-09-06 ENCOUNTER — OFFICE VISIT (OUTPATIENT)
Dept: CARDIOLOGY CLINIC | Age: 73
End: 2018-09-06

## 2018-09-06 VITALS
HEART RATE: 63 BPM | DIASTOLIC BLOOD PRESSURE: 54 MMHG | WEIGHT: 122 LBS | HEIGHT: 62 IN | BODY MASS INDEX: 22.45 KG/M2 | SYSTOLIC BLOOD PRESSURE: 103 MMHG

## 2018-09-06 DIAGNOSIS — I34.1 MITRAL VALVE PROLAPSE: Primary | ICD-10-CM

## 2018-09-06 DIAGNOSIS — I10 ESSENTIAL HYPERTENSION: ICD-10-CM

## 2018-09-06 DIAGNOSIS — I34.0 NON-RHEUMATIC MITRAL REGURGITATION: ICD-10-CM

## 2018-09-06 DIAGNOSIS — E78.5 DYSLIPIDEMIA: ICD-10-CM

## 2018-09-06 RX ORDER — HYDROXYCHLOROQUINE SULFATE 200 MG/1
400 TABLET, FILM COATED ORAL DAILY
COMMUNITY

## 2018-09-06 RX ORDER — CHOLECALCIFEROL TAB 125 MCG (5000 UNIT) 125 MCG
TAB ORAL DAILY
COMMUNITY

## 2018-09-06 RX ORDER — PREDNISONE 5 MG/1
TABLET ORAL
COMMUNITY

## 2018-09-06 NOTE — MR AVS SNAPSHOT
[address] [facility] [address] 
 
 
 [address] [facility], [address] [facility] [address] 
[phone] Patient: Janent Bingham MRN: [id number] TBW:[birth year] Visit Information Date & Time Provider Department Dept. Phone Encounter #  
 9/6/2018  1:30 PM Samuel Khan MD [facility] [address] [place] [address] [id number] Follow-up Instructions Return in about 4 weeks (around 10/4/2018). Your Appointments 10/4/2018  3:15 PM  
Office Visit with Samuel Khan MD  
[facility]) Appt Note: post KENNY  
 [address] [facility], [address] [facility] [address] [address], [address] Upcoming Health Maintenance Date Due Hepatitis C Screening 1945 DTaP/Tdap/Td series (1 - Tdap) 9/1/1968 FOBT Q 1 YEAR AGE 50-75 9/1/1997 ZOSTER VACCINE AGE 60> 7/1/2007 GLAUCOMA SCREENING Q2Y 9/1/2012 Bone Densitometry (Dexa) Screening 9/1/2012 Pneumococcal 65+ Low/Medium Risk (1 of 2 - PCV13) 9/1/2012 BREAST CANCER SCRN MAMMOGRAM 8/20/2014 Influenza Age 5 to Adult 8/1/2018 MEDICARE YEARLY EXAM 8/31/2018 Allergies as of 9/6/2018  Review Complete On: 9/6/2018 By: Ally Carvalho' Severity Noted Reaction Type Reactions Diflucan [Fluconazole] High 06/10/2014   Systemic Swelling Mouth swelling Codeine Low 06/10/2014   Side Effect Nausea and Vomiting  
 Sulfa (Sulfonamide Antibiotics) Low 06/10/2014   Topical Rash Current Immunizations  Never Reviewed No immunizations on file. Not reviewed this visit You Were Diagnosed With   
  
 Codes Comments Mitral valve prolapse    -  Primary ICD-10-CM: I34.1 ICD-9-CM: 424.0 Non-rheumatic mitral regurgitation     ICD-10-CM: I34.0 ICD-9-CM: 424.0 moderate to severe Essential hypertension     ICD-10-CM: I10 
ICD-9-CM: 401.9 Dyslipidemia     ICD-10-CM: E78.5 ICD-9-CM: 272.4 Vitals BP Pulse Height(growth percentile) Weight(growth percentile) BMI Smoking Status 103/54 63 5' 2\" (1.575 m) 122 lb (55.3 kg) 22.31 kg/m2 Never Smoker Vitals History BMI and BSA Data Body Mass Index Body Surface Area  
 22.31 kg/m 2 1.56 m 2 Your Updated Medication List  
  
   
This list is accurate as of 9/6/18  2:55 PM.  Always use your most recent med list.  
  
  
  
  
 AMBIEN 5 mg tablet Generic drug:  zolpidem Take 2.5 mg by mouth nightly as needed for Sleep. cholecalciferol (VITAMIN D3) 5,000 unit Tab tablet Commonly known as:  VITAMIN D3 Take  by mouth daily. CYMBALTA 30 mg capsule Generic drug:  DULoxetine Take 30 mg by mouth daily. FIORICET PO Take  by mouth. IMITREX 50 mg tablet Generic drug:  SUMAtriptan Take 50 mg by mouth once as needed for Migraine. nadolol 40 mg tablet Commonly known as:  CORGARD Take  by mouth daily. PLAQUENIL 200 mg tablet Generic drug:  hydroxychloroquine Take 200 mg by mouth daily. predniSONE 5 mg tablet Commonly known as:  [place] Take  by mouth. traZODone 50 mg tablet Commonly known as:  Debbe Gunner Take  by mouth nightly. ZOCOR 40 mg tablet Generic drug:  simvastatin Take 20 mg by mouth nightly. Follow-up Instructions Return in about 4 weeks (around 10/4/2018). To-Do List   
 09/14/2018 Echocardiography:  ECHO KENNY W OR WO CONTRAST Introducing [facility] & HEALTH SERVICES! [facility] introduces [employer] patient portal. Now you can access parts of your medical record, email your doctor's office, and request medication refills online. 1. In your internet browser, go to [email or web address] 2. Click on the First Time User? Click Here link in the Sign In box. You will see the New Member Sign Up page. 3. Enter your [employer] Access Code exactly as it appears below.  You will not need to use this code after youve completed the sign-up process. If you do not sign up before the expiration date, you must request a new code. · [employer] Access Code: [email or web address] Expires: 10/10/2018  1:32 PM 
 
4. Enter the last four digits of your Social Security Number (xxxx) and Date of Birth (mm/dd/yyyy) as indicated and click Submit. You will be taken to the next sign-up page. 5. Create a [employer] ID. This will be your [employer] login ID and cannot be changed, so think of one that is secure and easy to remember. 6. Create a [employer] password. You can change your password at any time. 7. Enter your Password Reset Question and Answer. This can be used at a later time if you forget your password. 8. Enter your e-mail address. You will receive e-mail notification when new information is available in [address] 9. Click Sign Up. You can now view and download portions of your medical record. 10. Click the Download Summary menu link to download a portable copy of your medical information. If you have questions, please visit the Frequently Asked Questions section of the [employer] website. Remember, [employer] is NOT to be used for urgent needs. For medical emergencies, dial 911. Now available from your iPhone and Android! Please provide this summary of care documentation to your next provider. Your primary care clinician is listed as NONE. If you have any questions after today's visit, please call [phone].

## 2018-09-06 NOTE — PROGRESS NOTES
Patient didn't bring medications, verbally reviewed    1. Have you been to the ER, urgent care clinic since your last visit? Hospitalized since your last visit? No    2. Have you seen or consulted any other health care providers outside of the [address] [place] [address] since your last visit? Include any pap smears or colon screening.  Yes Where: Rhuematology Reason for visit: Pain

## 2018-09-09 NOTE — PROGRESS NOTES
HISTORY OF PRESENT ILLNESS  Lorna Arellano is a 72 y.o. female. Hypertension   Pertinent negatives include no chest pain and no shortness of breath. Valvular Heart Disease   The history is provided by the patient. This is a chronic problem. The problem occurs every several days. The problem has not changed since onset. Pertinent negatives include no chest pain and no shortness of breath. Review of Systems   Constitutional: Negative for chills, diaphoresis, fever, malaise/fatigue and weight loss. HENT: Negative for ear discharge, ear pain, hearing loss, nosebleeds and tinnitus. Eyes: Negative for blurred vision. Respiratory: Negative for cough, hemoptysis, sputum production, shortness of breath, wheezing and stridor. Cardiovascular: Negative for chest pain, palpitations, orthopnea, claudication, leg swelling and PND. Gastrointestinal: Negative for heartburn, nausea and vomiting. Musculoskeletal: Negative for myalgias and neck pain. Skin: Negative for itching and rash. Neurological: Negative for dizziness, tingling, tremors, focal weakness, loss of consciousness and weakness. Psychiatric/Behavioral: Negative for depression and suicidal ideas.      Family History   Problem Relation Age of Onset    Breast Cancer Mother 80    Hypertension Mother     Heart Surgery Mother     Hypertension Father        Past Medical History:   Diagnosis Date    Breast cancer ([facility] [address] lt mastectomy '92    per pt       Past Surgical History:   Procedure Laterality Date    HX BREAST AUGMENTATION  rt implant '89       Social History   Substance Use Topics    Smoking status: Never Smoker    Smokeless tobacco: Never Used    Alcohol use No       Allergies   Allergen Reactions    Diflucan [Fluconazole] Swelling     Mouth swelling    Codeine Nausea and Vomiting    Sulfa (Sulfonamide Antibiotics) Rash       Outpatient Prescriptions Marked as Taking for the 9/6/18 encounter (Office Visit) with Frantz Srinivaasn MD Medication Sig Dispense Refill    predniSONE (DELTASONE) 5 mg tablet Take  by mouth.  hydroxychloroquine (PLAQUENIL) 200 mg tablet Take 200 mg by mouth daily.  cholecalciferol, VITAMIN D3, (VITAMIN D3) 5,000 unit tab tablet Take  by mouth daily.  zolpidem (AMBIEN) 5 mg tablet Take 2.5 mg by mouth nightly as needed for Sleep.  DULoxetine (CYMBALTA) 30 mg capsule Take 30 mg by mouth daily.  simvastatin (ZOCOR) 40 mg tablet Take 20 mg by mouth nightly.  nadolol (CORGARD) 40 mg tablet Take  by mouth daily.  SUMAtriptan (IMITREX) 50 mg tablet Take 50 mg by mouth once as needed for Migraine. Visit Vitals    /54    Pulse 63    Ht 5' 2\" (1.575 m)    Wt 55.3 kg (122 lb)    BMI 22.31 kg/m2     Physical Exam   Constitutional: She is oriented to person, place, and time. She appears well-developed and well-nourished. No distress. HENT:   Head: Atraumatic. Mouth/Throat: No oropharyngeal exudate. Eyes: Conjunctivae are normal. Right eye exhibits no discharge. Left eye exhibits no discharge. No scleral icterus. Neck: Normal range of motion. Neck supple. No JVD present. No tracheal deviation present. No thyromegaly present. Cardiovascular: Normal rate and regular rhythm. Exam reveals no gallop. Murmur (2/6 holosystolic blowing murmur best heard at apex with radiation to axilla) heard. Pulmonary/Chest: Effort normal and breath sounds normal. No stridor. No respiratory distress. She has no wheezes. She has no rales. She exhibits no tenderness. Abdominal: Soft. There is no tenderness. There is no rebound and no guarding. Musculoskeletal: Normal range of motion. She exhibits no edema or tenderness. Lymphadenopathy:     She has no cervical adenopathy. Neurological: She is alert and oriented to person, place, and time. She exhibits normal muscle tone. Skin: Skin is warm. She is not diaphoretic. Psychiatric: She has a normal mood and affect.  Her behavior is normal.     ekg sinus rhythm with no acute st-t changes. Echo 04/2017:  SUMMARY:  Left ventricle: Systolic function was normal. Ejection fraction was  estimated in the range of 55 % to 60 %. There were no regional wall motion  abnormalities. Left atrium: The atrium was moderately dilated. Mitral valve: There was mild annular calcification. There was a moderate  prolapse involving the anterior and posterior leaflets. There was moderate  to severe regurgitation. The effective orifice of mitral regurgitation by  proximal isovelocity surface area was 0.13 cmï¾². The volume of mitral  regurgitation by proximal isovelocity surface area was 23 ml.  07/18/18   ECHO ADULT COMPLETE 07/18/2018 7/18/2018    Narrative · Calculated left ventricular ejection fraction is 65%. Normal left   ventricular wall motion, no regional wall motion abnormality noted. Moderate (grade 2) left ventricular diastolic dysfunction. · Left atrial cavity size is severely dilated. · Mild to moderate aortic valve regurgitation is present. · Moderate to severe mitral valve regurgitation. Prolapse of the anterior   leaflet within the mitral valve. Prolapse of the posterior leaflet within   the mitral valve. · Mild tricuspid valve regurgitation is present. Pulmonary arterial   systolic pressure is 28 mmHg. There is no evidence of pulmonary   hypertension. · Mild pulmonic valve regurgitation is present. Signed by: Clarissa Burgos MD       ASSESSMENT and PLAN    ICD-10-CM ICD-9-CM    1. Mitral valve prolapse I34.1 424.0    2. Non-rheumatic mitral regurgitation I34.0 424.0 ECHO KENNY W OR WO CONTRAST    moderate to severe   3. Essential hypertension I10 401.9    4. Dyslipidemia E78.5 272.4      No orders of the defined types were placed in this encounter. Follow-up Disposition:  Return in about 4 weeks (around 10/4/2018).   current treatment plan is effective, no change in therapy  cardiovascular risk and specific lipid/LDL goals reviewed. 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 She claims to be asymptomatic with good ET.  ETT done last year- showed good functional capacity.   Echo report discussed with patient at length- has enlarged LA size  Will proceed with KENNY to evaluate VHD  F/u in 4 weeks

## 2018-09-20 ENCOUNTER — ANESTHESIA (OUTPATIENT)
Dept: CARDIAC CATH/INVASIVE PROCEDURES | Age: 73
End: 2018-09-20
Payer: MEDICARE

## 2018-09-20 ENCOUNTER — HOSPITAL ENCOUNTER (OUTPATIENT)
Dept: NON INVASIVE DIAGNOSTICS | Age: 73
Discharge: HOME | End: 2018-09-20
Attending: INTERNAL MEDICINE | Admitting: INTERNAL MEDICINE
Payer: MEDICARE

## 2018-09-20 ENCOUNTER — ANESTHESIA EVENT (OUTPATIENT)
Dept: CARDIAC CATH/INVASIVE PROCEDURES | Age: 73
End: 2018-09-20
Payer: MEDICARE

## 2018-09-20 VITALS
WEIGHT: 120 LBS | BODY MASS INDEX: 22.08 KG/M2 | HEART RATE: 66 BPM | SYSTOLIC BLOOD PRESSURE: 107 MMHG | TEMPERATURE: 98.3 F | DIASTOLIC BLOOD PRESSURE: 56 MMHG | HEIGHT: 62 IN | OXYGEN SATURATION: 100 % | RESPIRATION RATE: 16 BRPM

## 2018-09-20 VITALS
DIASTOLIC BLOOD PRESSURE: 41 MMHG | HEART RATE: 62 BPM | OXYGEN SATURATION: 100 % | SYSTOLIC BLOOD PRESSURE: 102 MMHG | RESPIRATION RATE: 14 BRPM

## 2018-09-20 DIAGNOSIS — I34.89 MYXOID TRANSFORMATION OF MITRAL VALVE: ICD-10-CM

## 2018-09-20 LAB
ECHO MV AREA PISA: 0.6 CM2
ECHO MV EROA PISA: 0.6 CM2
ECHO MV REGURGITANT PEAK GRADIENT: 197.7 MMHG
ECHO MV REGURGITANT PEAK VELOCITY: 703.06 CM/S
ECHO MV REGURGITANT RADIUS PISA: 0.83 CM
ECHO MV REGURGITANT VOLUME: 78.18 CC
ECHO MV REGURGITANT VTIA: 141.23 CM
ECHO TV REGURGITANT MAX VELOCITY: 229.04 CM/S
ECHO TV REGURGITANT PEAK GRADIENT: 21 MMHG

## 2018-09-20 PROCEDURE — 93325 DOPPLER ECHO COLOR FLOW MAPG: CPT

## 2018-09-20 PROCEDURE — 74011250636 HC RX REV CODE- 250/636: Performed by: INTERNAL MEDICINE

## 2018-09-20 PROCEDURE — 76060000031 HC ANESTHESIA FIRST 0.5 HR

## 2018-09-20 PROCEDURE — 74011250636 HC RX REV CODE- 250/636

## 2018-09-20 RX ORDER — SODIUM CHLORIDE 9 MG/ML
25 INJECTION, SOLUTION INTRAVENOUS CONTINUOUS
Status: DISCONTINUED | OUTPATIENT
Start: 2018-09-20 | End: 2018-09-20 | Stop reason: HOSPADM

## 2018-09-20 RX ORDER — SODIUM CHLORIDE 9 MG/ML
20 INJECTION INTRAMUSCULAR; INTRAVENOUS; SUBCUTANEOUS ONCE
Status: DISCONTINUED | OUTPATIENT
Start: 2018-09-20 | End: 2018-09-20 | Stop reason: HOSPADM

## 2018-09-20 RX ORDER — ONDANSETRON 2 MG/ML
4 INJECTION INTRAMUSCULAR; INTRAVENOUS ONCE
Status: DISCONTINUED | OUTPATIENT
Start: 2018-09-20 | End: 2018-09-20 | Stop reason: HOSPADM

## 2018-09-20 RX ORDER — PHENYLEPHRINE HCL IN 0.9% NACL 1 MG/10 ML
SYRINGE (ML) INTRAVENOUS AS NEEDED
Status: DISCONTINUED | OUTPATIENT
Start: 2018-09-20 | End: 2018-09-20 | Stop reason: HOSPADM

## 2018-09-20 RX ORDER — SODIUM CHLORIDE 0.9 % (FLUSH) 0.9 %
5-10 SYRINGE (ML) INJECTION AS NEEDED
Status: DISCONTINUED | OUTPATIENT
Start: 2018-09-20 | End: 2018-09-20 | Stop reason: HOSPADM

## 2018-09-20 RX ORDER — PROPOFOL 10 MG/ML
INJECTION, EMULSION INTRAVENOUS AS NEEDED
Status: DISCONTINUED | OUTPATIENT
Start: 2018-09-20 | End: 2018-09-20 | Stop reason: HOSPADM

## 2018-09-20 RX ADMIN — Medication 100 MCG: at 09:46

## 2018-09-20 RX ADMIN — PROPOFOL 50 MG: 10 INJECTION, EMULSION INTRAVENOUS at 09:59

## 2018-09-20 RX ADMIN — PROPOFOL 50 MG: 10 INJECTION, EMULSION INTRAVENOUS at 09:45

## 2018-09-20 RX ADMIN — SODIUM CHLORIDE: 900 INJECTION, SOLUTION INTRAVENOUS at 09:40

## 2018-09-20 RX ADMIN — PROPOFOL 80 MG: 10 INJECTION, EMULSION INTRAVENOUS at 09:42

## 2018-09-20 RX ADMIN — PROPOFOL 50 MG: 10 INJECTION, EMULSION INTRAVENOUS at 09:55

## 2018-09-20 RX ADMIN — PROPOFOL 50 MG: 10 INJECTION, EMULSION INTRAVENOUS at 09:50

## 2018-09-20 RX ADMIN — Medication 100 MCG: at 09:51

## 2018-09-20 NOTE — ANESTHESIA POSTPROCEDURE EVALUATION
Post-Anesthesia Evaluation & Assessment Visit Vitals  BP 95/48  Pulse 64  Temp 36.8 °C (98.3 °F)  Resp 16  
 Ht 5' 2\" (1.575 m)  Wt 54.4 kg (120 lb)  SpO2 100%  BMI 21.95 kg/m2 Post-operative hydration adequate. Pain score (VAS): 0 Pain Scale 1: Numeric (0 - 10) (09/20/18 1010) Pain Intensity 1: 0 (09/20/18 1010) Managed. Mental status & Level of consciousness: returned to baseline Neurological status: returned to baseline Pulmonary status: airway patent, oxygen given as needed. Complications related to anesthesia: none Patient has met all discharge requirements. Additional comments: 
 
 
 
Erinn Avila MD 
September 20, 2018

## 2018-09-20 NOTE — H&P
H&P update No new symptoms Risks, benefits and alternatives of KENNY explained to patient. All questions answered

## 2018-09-20 NOTE — DISCHARGE INSTRUCTIONS
DISCHARGE SUMMARY from Nurse:    Please resume taking your home medications as prescribed. PATIENT INSTRUCTIONS:    After general anesthesia or intravenous sedation, for 24 hours or while taking prescription Narcotics:  · Limit your activities  · Do not drive and operate hazardous machinery  · Do not make important personal or business decisions  · Do  not drink alcoholic beverages  · If you have not urinated within 8 hours after discharge, please contact your surgeon on call. Report the following to your surgeon:  · Excessive pain, swelling, redness or odor of or around the surgical area  · Temperature over 100.5  · Nausea and vomiting lasting longer than 4 hours or if unable to take medications  · Any signs of decreased circulation or nerve impairment to extremity: change in color, persistent  numbness, tingling, coldness or increase pain  · Any questions    What to do at Home:  Recommended activity: Activity as tolerated and no driving for today, Do not sign legal documents, do not operate power tools or heavy equipment. *  Please update this list whenever your medications are discontinued, doses are      changed, or new medications (including over-the-counter products) are added. *  Please carry medication information at all times in case of emergency situations. These are general instructions for a healthy lifestyle:    No smoking/ No tobacco products/ Avoid exposure to second hand smoke  Surgeon General's Warning:  Quitting smoking now greatly reduces serious risk to your health.     Obesity, smoking, and sedentary lifestyle greatly increases your risk for illness    A healthy diet, regular physical exercise & weight monitoring are important for maintaining a healthy lifestyle    You may be retaining fluid if you have a history of heart failure or if you experience any of the following symptoms:  Weight gain of 3 pounds or more overnight or 5 pounds in a week, increased swelling in our hands or feet or shortness of breath while lying flat in bed. Please call your doctor as soon as you notice any of these symptoms; do not wait until your next office visit. Recognize signs and symptoms of STROKE:    F-face looks uneven    A-arms unable to move or move unevenly    S-speech slurred or non-existent    T-time-call 911 as soon as signs and symptoms begin-DO NOT go       Back to bed or wait to see if you get better-TIME IS BRAIN. Warning Signs of HEART ATTACK     Call 911 if you have these symptoms:   Chest discomfort. Most heart attacks involve discomfort in the center of the chest that lasts more than a few minutes, or that goes away and comes back. It can feel like uncomfortable pressure, squeezing, fullness, or pain.  Discomfort in other areas of the upper body. Symptoms can include pain or discomfort in one or both arms, the back, neck, jaw, or stomach.  Shortness of breath with or without chest discomfort.  Other signs may include breaking out in a cold sweat, nausea, or lightheadedness. Don't wait more than five minutes to call 911 - MINUTES MATTER! Fast action can save your life. Calling 911 is almost always the fastest way to get lifesaving treatment. Emergency Medical Services staff can begin treatment when they arrive -- up to an hour sooner than if someone gets to the hospital by car. The discharge information has been reviewed with the patient. The patient verbalized understanding. Discharge medications reviewed with the patient and appropriate educational materials and side effects teaching were provided. ___________________________________________________________________________________________________________________________________     Transesophageal Echocardiogram: What to Expect at [address]  A transesophageal echocardiogram is a test to help your doctor look at the inside of your heart. A small device called a transducer directs sound waves toward your heart. The sound waves make a picture of the heart's valves and chambers. Before the test, your throat was sprayed with medicine to numb it. You won't be able to eat or drink until the numbness wears off. Your throat may be sore for a few days. You may have had a sedative to help you relax. You may be unsteady after having sedation. It can take a few hours for the medicine's effects to wear off. Common side effects include nausea, vomiting, and feeling sleepy or tired. This care sheet gives you a general idea about how long it will take for you to recover. But each person recovers at a different pace. Follow the steps below to feel better as quickly as possible. How can you care for yourself at home? Activity    · If a sedative was used, your doctor will tell you when it is safe for you to do your normal activities.     · For your safety, do not drive or operate any machinery that could be dangerous. Wait until the medicine wears off and you can think clearly and react easily. Diet    · You can eat your normal diet. Follow-up care is a key part of your treatment and safety. Be sure to make and go to all appointments, and call your doctor if you are having problems. It's also a good idea to know your test results and keep a list of the medicines you take. When should you call for help? Watch closely for changes in your health, and be sure to contact your doctor if you have any problems. Where can you learn more? Go to http://deya-libia.info/. Enter X082 in the search box to learn more about \"Transesophageal Echocardiogram: What to Expect at Home. \"  Current as of: December 6, 2017  Content Version: 11.7  © 9740-3341 [employer]. Care instructions adapted under license by [employer] (which disclaims liability or warranty for this information).  If you have questions about a medical condition or this instruction, always ask your healthcare professional. [employer] disclaims any warranty or liability for your use of this information. Patient armband removed and shredded  MyChart Activation    Thank you for requesting access to [facility]. Please follow the instructions below to securely access and download your online medical record. [facility] allows you to send messages to your doctor, view your test results, renew your prescriptions, schedule appointments, and more. How Do I Sign Up? 1. In your internet browser, go to [email or web address]. 2. Click on the First Time User? Click Here link in the Sign In box. You will see the New Member Sign Up page. 3. Enter your [facility] Access Code exactly as it appears below. You will not need to use this code after youve completed the sign-up process. If you do not sign up before the expiration date, you must request a new code. [facility] Access Code: [email or web address]  Expires: 10/10/2018  1:32 PM (This is the date your [facility] access code will )    4. Enter the last four digits of your Social Security Number (xxxx) and Date of Birth (mm/dd/yyyy) as indicated and click Submit. You will be taken to the next sign-up page. 5. Create a [facility] ID. This will be your [facility] login ID and cannot be changed, so think of one that is secure and easy to remember. 6. Create a [facility] password. You can change your password at any time. 7. Enter your Password Reset Question and Answer. This can be used at a later time if you forget your password. 8. Enter your e-mail address. You will receive e-mail notification when new information is available in [address] 9. Click Sign Up. You can now view and download portions of your medical record. 10. Click the Download Summary menu link to download a portable copy of your medical information.     Additional Information    If you have questions, please visit the Frequently Asked Questions section of the [facility] website at [email or web address] Remember, [employer] is NOT to be used for urgent needs. For medical emergencies, dial 911.

## 2018-09-20 NOTE — ANESTHESIA PREPROCEDURE EVALUATION
Anesthetic History No history of anesthetic complications Review of Systems / Medical History Patient summary reviewed and pertinent labs reviewed Pulmonary Within defined limits Neuro/Psych Within defined limits Cardiovascular Hypertension Exercise tolerance: <4 METS 
  
GI/Hepatic/Renal 
Within defined limits Endo/Other Within defined limits Other Findings Physical Exam 
 
Airway Mallampati: III 
TM Distance: 4 - 6 cm Neck ROM: normal range of motion Mouth opening: Normal 
 
 Cardiovascular Regular rate and rhythm,  S1 and S2 normal,  no murmur, click, rub, or gallop Dental 
 
Dentition: Caps/crowns Pulmonary Breath sounds clear to auscultation Abdominal 
GI exam deferred Other Findings Anesthetic Plan ASA: 3 Anesthesia type: MAC Induction: Intravenous Anesthetic plan and risks discussed with: Patient

## 2018-09-20 NOTE — IP AVS SNAPSHOT
[address] Patient: Gus Wray MRN: [id number] HLI:[id number]/7/7711 About your hospitalization You were admitted on:  September 20, 2018 You last received care in the:  [facility] [address] You were discharged on:  September 20, 2018 Why you were hospitalized Your primary diagnosis was:  Not on File Follow-up Information None Your Scheduled Appointments Thursday October 04, 2018  3:15 PM EDT Office Visit with Iris Haywood MD  
[facility]) [address] [place] [address], [facility] [address] [id number] [phone] Discharge Orders None A check kerry indicates which time of day the medication should be taken. My Medications ASK your doctor about these medications Instructions Each Dose to Equal  
 Morning Noon Evening Bedtime AMBIEN 5 mg tablet Generic drug:  zolpidem Your last dose was: Your next dose is: Take 2.5 mg by mouth nightly as needed for Sleep. 2.5 mg  
    
   
   
   
  
 cholecalciferol (VITAMIN D3) 5,000 unit Tab tablet Commonly known as:  VITAMIN D3 Your last dose was: Your next dose is: Take  by mouth daily. CYMBALTA 30 mg capsule Generic drug:  DULoxetine Your last dose was: Your next dose is: Take 30 mg by mouth daily. 30 mg  
    
   
   
   
  
 IMITREX 50 mg tablet Generic drug:  SUMAtriptan Your last dose was: Your next dose is: Take 50 mg by mouth once as needed for Migraine. 50 mg  
    
   
   
   
  
 nadolol 40 mg tablet Commonly known as:  CORGARD Your last dose was: Your next dose is: Take  by mouth daily. PLAQUENIL 200 mg tablet Generic drug:  hydroxychloroquine Your last dose was: Your next dose is: Take 200 mg by mouth daily. 200 mg  
    
   
   
   
  
 predniSONE 5 mg tablet Commonly known as:  [place] Your last dose was: Your next dose is: Take  by mouth. ZOCOR 40 mg tablet Generic drug:  simvastatin Your last dose was: Your next dose is: Take 20 mg by mouth nightly. 20 mg Discharge Instructions DISCHARGE SUMMARY from Nurse: 
 
Please resume taking your home medications as prescribed. PATIENT INSTRUCTIONS: 
 
 
F-face looks uneven A-arms unable to move or move unevenly S-speech slurred or non-existent T-time-call 911 as soon as signs and symptoms begin-DO NOT go Back to bed or wait to see if you get better-TIME IS BRAIN. Warning Signs of HEART ATTACK Call 911 if you have these symptoms: 
? Chest discomfort. Most heart attacks involve discomfort in the center of the chest that lasts more than a few minutes, or that goes away and comes back. It can feel like uncomfortable pressure, squeezing, fullness, or pain. ? Discomfort in other areas of the upper body. Symptoms can include pain or discomfort in one or both arms, the back, neck, jaw, or stomach. ? Shortness of breath with or without chest discomfort. ? Other signs may include breaking out in a cold sweat, nausea, or lightheadedness. Don't wait more than five minutes to call [address]! Fast action can save your life. Calling 911 is almost always the fastest way to get lifesaving treatment. Emergency Medical Services staff can begin treatment when they arrive  up to an hour sooner than if someone gets to the hospital by car. The discharge information has been reviewed with the patient.   The patient verbalized understanding. Discharge medications reviewed with the patient and appropriate educational materials and side effects teaching were provided. ___________________________________________________________________________________________________________________________________ Transesophageal Echocardiogram: What to Expect at [facility] Your Recovery A transesophageal echocardiogram is a test to help your doctor look at the inside of your heart. A small device called a transducer directs sound waves toward your heart. The sound waves make a picture of the heart's valves and chambers. Before the test, your throat was sprayed with medicine to numb it. You won't be able to eat or drink until the numbness wears off. Your throat may be sore for a few days. You may have had a sedative to help you relax. You may be unsteady after having sedation. It can take a few hours for the medicine's effects to wear off. Common side effects include nausea, vomiting, and feeling sleepy or tired. This care sheet gives you a general idea about how long it will take for you to recover. But each person recovers at a different pace. Follow the steps below to feel better as quickly as possible. How can you care for yourself at home? Activity 
  · If a sedative was used, your doctor will tell you when it is safe for you to do your normal activities.  
  · For your safety, do not drive or operate any machinery that could be dangerous. Wait until the medicine wears off and you can think clearly and react easily. Diet 
  · You can eat your normal diet. Follow-up care is a key part of your treatment and safety. Be sure to make and go to all appointments, and call your doctor if you are having problems. It's also a good idea to know your test results and keep a list of the medicines you take. When should you call for help? Watch closely for changes in your health, and be sure to contact your doctor if you have any problems. Where can you learn more? Go to http://deya-libia.info/. Enter Z285 in the search box to learn more about \"Transesophageal Echocardiogram: What to Expect at Home. \" Current as of: 2017 Content Version: 11.7 © 2817-4777 Inline.me. Care instructions adapted under license by [employer] (which disclaims liability or warranty for this information). If you have questions about a medical condition or this instruction, always ask your healthcare professional. [address] any warranty or liability for your use of this information. Patient armband removed and shredded MyChart Activation Thank you for requesting access to Sciences-U. Please follow the instructions below to securely access and download your online medical record. Sciences-U allows you to send messages to your doctor, view your test results, renew your prescriptions, schedule appointments, and more. How Do I Sign Up? 1. In your internet browser, go to [email or web address]. 2. Click on the First Time User? Click Here link in the Sign In box. You will see the New Member Sign Up page. 3. Enter your Sciences-U Access Code exactly as it appears below. You will not need to use this code after youve completed the sign-up process. If you do not sign up before the expiration date, you must request a new code. Sciences-U Access Code: [email or web address] Expires: 10/10/2018  1:32 PM (This is the date your Sciences-U access code will ) 4. Enter the last four digits of your Social Security Number (xxxx) and Date of Birth (mm/dd/yyyy) as indicated and click Submit. You will be taken to the next sign-up page. 5. Create a Sciences-U ID. This will be your Sciences-U login ID and cannot be changed, so think of one that is secure and easy to remember. 6. Create a Sciences-U password. You can change your password at any time. 7. Enter your Password Reset Question and Answer. This can be used at a later time if you forget your password. 8. Enter your e-mail address. You will receive e-mail notification when new information is available in [address] 9. Click Sign Up. You can now view and download portions of your medical record. 10. Click the Download Summary menu link to download a portable copy of your medical information. Additional Information If you have questions, please visit the Frequently Asked Questions section of the [employer] website at [email or web address] Remember, [employer] is NOT to be used for urgent needs. For medical emergencies, dial 911. Introducing [facility] SERVICES! Yuki Grubbs introduces [employer] patient portal. Now you can access parts of your medical record, email your doctor's office, and request medication refills online. 1. In your internet browser, go to [email or web address] 2. Click on the First Time User? Click Here link in the Sign In box. You will see the New Member Sign Up page. 3. Enter your [employer] Access Code exactly as it appears below. You will not need to use this code after youve completed the sign-up process. If you do not sign up before the expiration date, you must request a new code. · [employer] Access Code: [email or web address] Expires: 10/10/2018  1:32 PM 
 
4. Enter the last four digits of your Social Security Number (xxxx) and Date of Birth (mm/dd/yyyy) as indicated and click Submit. You will be taken to the next sign-up page. 5. Create a [employer] ID. This will be your [employer] login ID and cannot be changed, so think of one that is secure and easy to remember. 6. Create a [employer] password. You can change your password at any time. 7. Enter your Password Reset Question and Answer. This can be used at a later time if you forget your password. 8. Enter your e-mail address.  You will receive e-mail notification when new information is available in StyleSaintt. 9. Click Sign Up. You can now view and download portions of your medical record. 10. Click the Download Summary menu link to download a portable copy of your medical information. If you have questions, please visit the Frequently Asked Questions section of the StyleSaintt website. Remember, Vizury is NOT to be used for urgent needs. For medical emergencies, dial 911. Now available from your iPhone and Android! Introducing Darío Chen As a [facility] patient, I wanted to make you aware of our electronic visit tool called Darío Chen. Torrent LoadingSystems 24/7 allows you to connect within minutes with a medical provider 24 hours a day, seven days a week via a mobile device or tablet or logging into a secure website from your computer. You can access Darío Chen from anywhere in the [place]. A virtual visit might be right for you when you have a simple condition and feel like you just dont want to get out of bed, or cant get away from work for an appointment, when your regular [facility] [employer] [facility] provider is not available (evenings, weekends or holidays), or when youre out of town and need minor care. Electronic visits cost only $49 and if the ValerioSkyGiraffe 24/7 provider determines a prescription is needed to treat your condition, one can be electronically transmitted to a nearby pharmacy*. Please take a moment to enroll today if you have not already done so. The enrollment process is free and takes just a few minutes. To enroll, please download the Torrent LoadingSystems 24/7 marie to your tablet or phone, or visit [email or web address] to enroll on your computer. And, as an [address] [place] [address] patient with a [employer] account, the results of your visits will be scanned into your electronic medical record and your primary care provider will be able to view the scanned results. We urge you to continue to see your regular Tanja Griggs provider for your ongoing medical care. And while your primary care provider may not be the one available when you seek a Darío Newellpastor virtual visit, the peace of mind you get from getting a real diagnosis real time can be priceless. For more information on Darío Newelltammyfin, view our Frequently Asked Questions (FAQs) at [email or web address]. Sincerely, 
 
Alex Meyers MD 
Chief Medical Officer [employer] *:  certain medications cannot be prescribed via Darío Chen Providers Seen During Your Hospitalization Provider Specialty Primary office phone Alexandra Mathews MD Cardiology [phone] Your Primary Care Physician (PCP) Primary Care Physician Office Phone Office Fax NONE ** None ** ** None ** You are allergic to the following Allergen Reactions Diflucan (Fluconazole) Swelling Mouth swelling Codeine Nausea and Vomiting  
    
 Sulfa (Sulfonamide Antibiotics) Rash Recent Documentation Height Weight BMI Smoking Status 1.575 m 54.4 kg 21.95 kg/m2 Never Smoker Emergency Contacts Name Discharge Info Relation Home Work Mobile Parvin James  Child [2] [phone] Parvin James  Child [2] [phone] Patient Belongings The following personal items are in your possession at time of discharge: 
                   Clothing: At bedside Please provide this summary of care documentation to your next provider. Signatures-by signing, you are acknowledging that this After Visit Summary has been reviewed with you and you have received a copy. Patient Signature:  ____________________________________________________________ Date:  ____________________________________________________________  
  
Aloma Snellen  Provider Signature: ____________________________________________________________ Date:  ____________________________________________________________

## 2018-09-20 NOTE — IP AVS SNAPSHOT
Summary of Care Report The Summary of Care report has been created to help improve care coordination. Users with access to Ak[employer]ia or [address] (Web-based application) may access additional patient information including the Discharge Summary. If you are not currently a [address] user and need more information, please call the number listed below in the [place] [address] section and ask to be connected with Medical Records. Facility Information Name Address Phone [place] [address] [facility] [address] [phone] Patient Information Patient Name Sex  Read Dial ([id number]) Female 1945 Discharge Information Admitting Provider Service Area Unit Alexandra Mathews MD / [address] / [phone] Discharge Provider Discharge Date/Time Discharge Disposition Destination (none) (none) (none) (none) Patient Language Language ENGLISH [13] Hospital Problems as of 2018  Reviewed: 6/10/2014  8:00 AM by Dileep Gonzalez MD  
 None Non-Hospital Problems as of 2018  Reviewed: 6/10/2014  8:00 AM by Dileep Gonzalez MD  
  
  
  
 Class Noted - Resolved Last Modified Active Problems Essential hypertension  2017 - Present 2017 by Alexandra Mathews MD  
  Entered by Alexandra Mathews MD  
  Dyslipidemia  2017 - Present 2017 by Alexandra Mathews MD  
  Entered by Alexandra Mathews MD  
  Non-rheumatic mitral regurgitation  2017 - Present 2017 by Alexandra Mathews MD  
  Entered by Alexandra Mathews MD  
  Mitral valve prolapse  2017 - Present 2017 by Alexandra Mathews MD  
  Entered by Alexandra Mathews MD  
  
You are allergic to the following Allergen Reactions Diflucan (Fluconazole) Swelling Mouth swelling Codeine Nausea and Vomiting  
    
 Sulfa (Sulfonamide Antibiotics) Rash Current Discharge Medication List  
  
ASK your doctor about these medications Dose & Instructions Dispensing Information Comments AMBIEN 5 mg tablet Generic drug:  zolpidem Dose:  2.5 mg Take 2.5 mg by mouth nightly as needed for Sleep. Refills:  0  
   
 cholecalciferol (VITAMIN D3) 5,000 unit Tab tablet Commonly known as:  VITAMIN D3 Take  by mouth daily. Refills:  0  
   
 CYMBALTA 30 mg capsule Generic drug:  DULoxetine Dose:  30 mg Take 30 mg by mouth daily. Refills:  0 IMITREX 50 mg tablet Generic drug:  SUMAtriptan Dose:  50 mg Take 50 mg by mouth once as needed for Migraine. Refills:  0  
   
 nadolol 40 mg tablet Commonly known as:  CORGARD Take  by mouth daily. Refills:  0  
   
 PLAQUENIL 200 mg tablet Generic drug:  hydroxychloroquine Dose:  200 mg Take 200 mg by mouth daily. Refills:  0  
   
 predniSONE 5 mg tablet Commonly known as:  Pineda Fossa Take  by mouth. Refills:  0 ZOCOR 40 mg tablet Generic drug:  simvastatin Dose:  20 mg Take 20 mg by mouth nightly. Refills:  0 Surgery Information ID Date/Time Status Primary Surgeon All Procedures Location [id number] 9/20/2018 Unposted   [facility] DO NOT SCHEDULE Follow-up Information None Discharge Instructions DISCHARGE SUMMARY from Nurse: 
 
Please resume taking your home medications as prescribed. PATIENT INSTRUCTIONS: 
 
 
F-face looks uneven A-arms unable to move or move unevenly S-speech slurred or non-existent T-time-call 911 as soon as signs and symptoms begin-DO NOT go Back to bed or wait to see if you get better-TIME IS BRAIN.  
 
Warning Signs of HEART ATTACK  
 
 Call 911 if you have these symptoms: 
? Chest discomfort. Most heart attacks involve discomfort in the center of the chest that lasts more than a few minutes, or that goes away and comes back. It can feel like uncomfortable pressure, squeezing, fullness, or pain. ? Discomfort in other areas of the upper body. Symptoms can include pain or discomfort in one or both arms, the back, neck, jaw, or stomach. ? Shortness of breath with or without chest discomfort. ? Other signs may include breaking out in a cold sweat, nausea, or lightheadedness. Don't wait more than five minutes to call [address]! Fast action can save your life. Calling 911 is almost always the fastest way to get lifesaving treatment. Emergency Medical Services staff can begin treatment when they arrive  up to an hour sooner than if someone gets to the hospital by car. The discharge information has been reviewed with the patient. The patient verbalized understanding. Discharge medications reviewed with the patient and appropriate educational materials and side effects teaching were provided. ___________________________________________________________________________________________________________________________________ Transesophageal Echocardiogram: What to Expect at [facility] Your Recovery A transesophageal echocardiogram is a test to help your doctor look at the inside of your heart. A small device called a transducer directs sound waves toward your heart. The sound waves make a picture of the heart's valves and chambers. Before the test, your throat was sprayed with medicine to numb it. You won't be able to eat or drink until the numbness wears off. Your throat may be sore for a few days. You may have had a sedative to help you relax. You may be unsteady after having sedation. It can take a few hours for the medicine's effects to wear off. Common side effects include nausea, vomiting, and feeling sleepy or tired. This care sheet gives you a general idea about how long it will take for you to recover. But each person recovers at a different pace. Follow the steps below to feel better as quickly as possible. How can you care for yourself at home? Activity 
  · If a sedative was used, your doctor will tell you when it is safe for you to do your normal activities.  
  · For your safety, do not drive or operate any machinery that could be dangerous. Wait until the medicine wears off and you can think clearly and react easily. Diet 
  · You can eat your normal diet. Follow-up care is a key part of your treatment and safety. Be sure to make and go to all appointments, and call your doctor if you are having problems. It's also a good idea to know your test results and keep a list of the medicines you take. When should you call for help? Watch closely for changes in your health, and be sure to contact your doctor if you have any problems. Where can you learn more? Go to http://deya-libia.info/. Enter S088 in the search box to learn more about \"Transesophageal Echocardiogram: What to Expect at Home. \" Current as of: December 6, 2017 Content Version: 11.7 © 5012-6530 Metabolix. Care instructions adapted under license by [employer] (which disclaims liability or warranty for this information). If you have questions about a medical condition or this instruction, always ask your healthcare professional. [place] [address] any warranty or liability for your use of this information. Patient armband removed and shredded MyChart Activation Thank you for requesting access to DataRank. Please follow the instructions below to securely access and download your online medical record. DataRank allows you to send messages to your doctor, view your test results, renew your prescriptions, schedule appointments, and more. How Do I Sign Up? 1. In your internet browser, go to [email or web address]. 2. Click on the First Time User? Click Here link in the Sign In box. You will see the New Member Sign Up page. 3. Enter your Contextool Access Code exactly as it appears below. You will not need to use this code after youve completed the sign-up process. If you do not sign up before the expiration date, you must request a new code. Contextool Access Code: [email or web address] Expires: 10/10/2018  1:32 PM (This is the date your Contextool access code will ) 4. Enter the last four digits of your Social Security Number (xxxx) and Date of Birth (mm/dd/yyyy) as indicated and click Submit. You will be taken to the next sign-up page. 5. Create a Contextool ID. This will be your Contextool login ID and cannot be changed, so think of one that is secure and easy to remember. 6. Create a Contextool password. You can change your password at any time. 7. Enter your Password Reset Question and Answer. This can be used at a later time if you forget your password. 8. Enter your e-mail address. You will receive e-mail notification when new information is available in [address] 9. Click Sign Up. You can now view and download portions of your medical record. 10. Click the Download Summary menu link to download a portable copy of your medical information. Additional Information If you have questions, please visit the Frequently Asked Questions section of the Contextool website at [email or web address] Remember, Contextool is NOT to be used for urgent needs. For medical emergencies, dial 911. Chart Review Routing History No Routing History on File

## 2018-09-20 NOTE — ROUTINE PROCESS
A+Ox4, denied any complaints, ambulatory without difficulty. Discharge information reviewed. Escorted to her car, tot her daughter for transport.

## 2018-09-25 ENCOUNTER — TELEPHONE (OUTPATIENT)
Dept: CARDIOLOGY CLINIC | Age: 73
End: 2018-09-25

## 2018-10-04 ENCOUNTER — OFFICE VISIT (OUTPATIENT)
Dept: CARDIOLOGY CLINIC | Age: 73
End: 2018-10-04

## 2018-10-04 VITALS
SYSTOLIC BLOOD PRESSURE: 103 MMHG | HEART RATE: 65 BPM | DIASTOLIC BLOOD PRESSURE: 50 MMHG | WEIGHT: 123 LBS | BODY MASS INDEX: 22.63 KG/M2 | HEIGHT: 62 IN

## 2018-10-04 DIAGNOSIS — I34.0 NON-RHEUMATIC MITRAL REGURGITATION: Primary | ICD-10-CM

## 2018-10-04 NOTE — PROGRESS NOTES
Patient didn't bring medications, verbally reviewed. 1. Have you been to the ER, urgent care clinic since your last visit? Hospitalized since your last visit? No    2. Have you seen or consulted any other health care providers outside of the [address] [place] [address] since your last visit? Include any pap smears or colon screening.  Yes Where: Rheumatology Reason for visit: Routine

## 2018-10-04 NOTE — MR AVS SNAPSHOT
[address] [facility] 
 
 
 [address] [facility], [address] [facility] [address] 
[phone] Patient: Sudeep De La Fuente MRN: [id number] EI Visit Information Date & Time Provider Department Dept. Phone Encounter #  
 10/4/2018  3:15 PM Sebastian Mcdonald MD [facility] [address] [place] [address] [id number] Your Appointments 2019 12:15 PM  
Office Visit with Sebastian Mcdonald MD  
[facility]) Appt Note: 3 m [address] [facility], [address] [facility] [address]  
[address] [facility], [address] [place] [address] Upcoming Health Maintenance Date Due Hepatitis C Screening 1945 DTaP/Tdap/Td series (1 - Tdap) 1968 Shingrix Vaccine Age 50> (1 of 2) 1997 FOBT Q 1 YEAR AGE 50-75 1997 GLAUCOMA SCREENING Q2Y 2012 Bone Densitometry (Dexa) Screening 2012 Pneumococcal 65+ Low/Medium Risk (1 of 2 - PCV13) 2012 BREAST CANCER SCRN MAMMOGRAM 2014 Influenza Age 5 to Adult 2018 Allergies as of 10/4/2018  Review Complete On: 10/4/2018 By: Marisol Victor' Severity Noted Reaction Type Reactions Diflucan [Fluconazole] High 06/10/2014   Systemic Swelling Mouth swelling Codeine Low 06/10/2014   Side Effect Nausea and Vomiting  
 Sulfa (Sulfonamide Antibiotics) Low 06/10/2014   Topical Rash Current Immunizations  Never Reviewed No immunizations on file. Not reviewed this visit Vitals BP Pulse Height(growth percentile) Weight(growth percentile) BMI Smoking Status 103/50 65 5' 2\" (1.575 m) 123 lb (55.8 kg) 22.5 kg/m2 Never Smoker Vitals History BMI and BSA Data Body Mass Index Body Surface Area  
 22.5 kg/m 2 1.56 m 2 Your Updated Medication List  
  
   
This list is accurate as of 10/4/18  3:56 PM.  Always use your most recent med list.  
  
  
  
  
 AMBIEN 5 mg tablet Generic drug:  zolpidem Take 2.5 mg by mouth nightly as needed for Sleep. cholecalciferol (VITAMIN D3) 5,000 unit Tab tablet Commonly known as:  VITAMIN D3 Take  by mouth daily. CYMBALTA 30 mg capsule Generic drug:  DULoxetine Take 30 mg by mouth daily. IMITREX 50 mg tablet Generic drug:  SUMAtriptan Take 50 mg by mouth once as needed for Migraine. nadolol 40 mg tablet Commonly known as:  CORGARD Take  by mouth daily. PLAQUENIL 200 mg tablet Generic drug:  hydroxychloroquine Take 400 mg by mouth daily. predniSONE 5 mg tablet Commonly known as:  Evelina Fairy Take  by mouth. ZOCOR 40 mg tablet Generic drug:  simvastatin Take 20 mg by mouth nightly. Introducing \[facility] [place] [facility]\"" & HEALTH SERVICES! [employer] introduces [facility] patient portal. Now you can access parts of your medical record, email your doctor's office, and request medication refills online. 1. In your internet browser, go to [email or web address] 2. Click on the First Time User? Click Here link in the Sign In box. You will see the New Member Sign Up page. 3. Enter your [facility] Access Code exactly as it appears below. You will not need to use this code after youve completed the sign-up process. If you do not sign up before the expiration date, you must request a new code. · [facility] Access Code: [email or web address] Expires: 10/10/2018  1:32 PM 
 
4. Enter the last four digits of your Social Security Number (xxxx) and Date of Birth (mm/dd/yyyy) as indicated and click Submit. You will be taken to the next sign-up page. 5. Create a VouchedFort ID. This will be your [facility] login ID and cannot be changed, so think of one that is secure and easy to remember. 6. Create a [facility] password. You can change your password at any time. 7. Enter your Password Reset Question and Answer. This can be used at a later time if you forget your password. 8. Enter your e-mail address. You will receive e-mail notification when new information is available in [address] 9. Click Sign Up. You can now view and download portions of your medical record. 10. Click the Download Summary menu link to download a portable copy of your medical information. If you have questions, please visit the Frequently Asked Questions section of the [employer] website. Remember, [employer] is NOT to be used for urgent needs. For medical emergencies, dial 911. Now available from your iPhone and Android! Please provide this summary of care documentation to your next provider. Your primary care clinician is listed as NONE. If you have any questions after today's visit, please call [phone].

## 2018-10-15 NOTE — PROGRESS NOTES
Patient with severe MR with preserved LV function. Has appt with Dr Mary Lou Groves.   Will f/u in 3 months

## 2018-11-19 ENCOUNTER — TELEPHONE (OUTPATIENT)
Dept: CARDIOLOGY CLINIC | Age: 73
End: 2018-11-19

## 2018-11-19 NOTE — TELEPHONE ENCOUNTER
Patient called and states that she's ready to schedule her cardiac cath, when would you like to schedule.

## 2018-11-20 NOTE — TELEPHONE ENCOUNTER
Called and advised patient that pre Win Parrish is pending for cath, will schedule on a Monday once pre auth obtained

## 2018-11-27 NOTE — TELEPHONE ENCOUNTER
Pre auth completed and patient scheduled with Edil Reagan for 12/3/18 @ 1 pm. Called and patient aware of date and time for procedure.

## 2018-12-03 ENCOUNTER — HOSPITAL ENCOUNTER (OUTPATIENT)
Age: 73
Setting detail: OUTPATIENT SURGERY
Discharge: HOME | End: 2018-12-03
Attending: INTERNAL MEDICINE | Admitting: INTERNAL MEDICINE
Payer: MEDICARE

## 2018-12-03 VITALS
DIASTOLIC BLOOD PRESSURE: 52 MMHG | HEART RATE: 62 BPM | SYSTOLIC BLOOD PRESSURE: 114 MMHG | BODY MASS INDEX: 22.08 KG/M2 | WEIGHT: 120 LBS | HEIGHT: 62 IN | RESPIRATION RATE: 20 BRPM | OXYGEN SATURATION: 98 %

## 2018-12-03 DIAGNOSIS — I34.0 MITRAL INSUFFICIENCY: ICD-10-CM

## 2018-12-03 LAB
ACT BLD: 147 SECS (ref 79–138)
ANION GAP SERPL CALC-SCNC: 5 MMOL/L (ref 3–18)
APTT PPP: 32 SEC (ref 23–36.4)
BASOPHILS # BLD: 0 K/UL (ref 0–0.1)
BASOPHILS NFR BLD: 0 % (ref 0–2)
BUN SERPL-MCNC: 15 MG/DL (ref 7–18)
BUN/CREAT SERPL: 25 (ref 12–20)
CALCIUM SERPL-MCNC: 9.2 MG/DL (ref 8.5–10.1)
CHLORIDE SERPL-SCNC: 105 MMOL/L (ref 100–108)
CO2 SERPL-SCNC: 28 MMOL/L (ref 21–32)
CREAT SERPL-MCNC: 0.61 MG/DL (ref 0.6–1.3)
DIFFERENTIAL METHOD BLD: ABNORMAL
EOSINOPHIL # BLD: 0.1 K/UL (ref 0–0.4)
EOSINOPHIL NFR BLD: 2 % (ref 0–5)
ERYTHROCYTE [DISTWIDTH] IN BLOOD BY AUTOMATED COUNT: 12.6 % (ref 11.6–14.5)
GLUCOSE SERPL-MCNC: 93 MG/DL (ref 74–99)
HCT VFR BLD AUTO: 36.2 % (ref 35–45)
HGB BLD-MCNC: 11.9 G/DL (ref 12–16)
LYMPHOCYTES # BLD: 0.9 K/UL (ref 0.9–3.6)
LYMPHOCYTES NFR BLD: 17 % (ref 21–52)
MCH RBC QN AUTO: 31.3 PG (ref 24–34)
MCHC RBC AUTO-ENTMCNC: 32.9 G/DL (ref 31–37)
MCV RBC AUTO: 95.3 FL (ref 74–97)
MONOCYTES # BLD: 0.6 K/UL (ref 0.05–1.2)
MONOCYTES NFR BLD: 11 % (ref 3–10)
NEUTS SEG # BLD: 3.7 K/UL (ref 1.8–8)
NEUTS SEG NFR BLD: 70 % (ref 40–73)
PLATELET # BLD AUTO: 177 K/UL (ref 135–420)
PMV BLD AUTO: 10.1 FL (ref 9.2–11.8)
POTASSIUM SERPL-SCNC: 3.8 MMOL/L (ref 3.5–5.5)
RBC # BLD AUTO: 3.8 M/UL (ref 4.2–5.3)
SODIUM SERPL-SCNC: 138 MMOL/L (ref 136–145)
WBC # BLD AUTO: 5.2 K/UL (ref 4.6–13.2)

## 2018-12-03 PROCEDURE — 77030016699 HC CATH ANGI DX INFN1 CARD -A: Performed by: INTERNAL MEDICINE

## 2018-12-03 PROCEDURE — 85730 THROMBOPLASTIN TIME PARTIAL: CPT

## 2018-12-03 PROCEDURE — 74011250636 HC RX REV CODE- 250/636: Performed by: INTERNAL MEDICINE

## 2018-12-03 PROCEDURE — 74011636320 HC RX REV CODE- 636/320: Performed by: INTERNAL MEDICINE

## 2018-12-03 PROCEDURE — C1751 CATH, INF, PER/CENT/MIDLINE: HCPCS | Performed by: INTERNAL MEDICINE

## 2018-12-03 PROCEDURE — 74011250637 HC RX REV CODE- 250/637

## 2018-12-03 PROCEDURE — 99153 MOD SED SAME PHYS/QHP EA: CPT | Performed by: INTERNAL MEDICINE

## 2018-12-03 PROCEDURE — 74011250636 HC RX REV CODE- 250/636

## 2018-12-03 PROCEDURE — 85025 COMPLETE CBC W/AUTO DIFF WBC: CPT

## 2018-12-03 PROCEDURE — 93460 R&L HRT ART/VENTRICLE ANGIO: CPT | Performed by: INTERNAL MEDICINE

## 2018-12-03 PROCEDURE — 85347 COAGULATION TIME ACTIVATED: CPT

## 2018-12-03 PROCEDURE — C1769 GUIDE WIRE: HCPCS | Performed by: INTERNAL MEDICINE

## 2018-12-03 PROCEDURE — 77030013797 HC KT TRNSDUC PRSSR EDWD -A: Performed by: INTERNAL MEDICINE

## 2018-12-03 PROCEDURE — C1894 INTRO/SHEATH, NON-LASER: HCPCS | Performed by: INTERNAL MEDICINE

## 2018-12-03 PROCEDURE — 80048 BASIC METABOLIC PNL TOTAL CA: CPT

## 2018-12-03 PROCEDURE — 99152 MOD SED SAME PHYS/QHP 5/>YRS: CPT | Performed by: INTERNAL MEDICINE

## 2018-12-03 RX ORDER — GUAIFENESIN 100 MG/5ML
81 LIQUID (ML) ORAL ONCE
Status: COMPLETED | OUTPATIENT
Start: 2018-12-03 | End: 2018-12-03

## 2018-12-03 RX ORDER — MIDAZOLAM HYDROCHLORIDE 1 MG/ML
INJECTION, SOLUTION INTRAMUSCULAR; INTRAVENOUS AS NEEDED
Status: DISCONTINUED | OUTPATIENT
Start: 2018-12-03 | End: 2018-12-03 | Stop reason: HOSPADM

## 2018-12-03 RX ORDER — FENTANYL CITRATE 50 UG/ML
INJECTION, SOLUTION INTRAMUSCULAR; INTRAVENOUS AS NEEDED
Status: DISCONTINUED | OUTPATIENT
Start: 2018-12-03 | End: 2018-12-03 | Stop reason: HOSPADM

## 2018-12-03 RX ORDER — GUAIFENESIN 100 MG/5ML
LIQUID (ML) ORAL
Status: COMPLETED
Start: 2018-12-03 | End: 2018-12-03

## 2018-12-03 RX ORDER — LIDOCAINE HYDROCHLORIDE 10 MG/ML
INJECTION, SOLUTION EPIDURAL; INFILTRATION; INTRACAUDAL; PERINEURAL AS NEEDED
Status: DISCONTINUED | OUTPATIENT
Start: 2018-12-03 | End: 2018-12-03 | Stop reason: HOSPADM

## 2018-12-03 RX ORDER — HEPARIN SODIUM 1000 [USP'U]/ML
INJECTION, SOLUTION INTRAVENOUS; SUBCUTANEOUS AS NEEDED
Status: DISCONTINUED | OUTPATIENT
Start: 2018-12-03 | End: 2018-12-03 | Stop reason: HOSPADM

## 2018-12-03 RX ADMIN — Medication 81 MG: at 11:25

## 2018-12-03 NOTE — DISCHARGE INSTRUCTIONS
DISCHARGE SUMMARY from Nurse    PATIENT INSTRUCTIONS:    After general anesthesia or intravenous sedation, for 24 hours or while taking prescription Narcotics:  · Limit your activities  · Do not drive and operate hazardous machinery  · Do not make important personal or business decisions  · Do  not drink alcoholic beverages  · If you have not urinated within 8 hours after discharge, please contact your surgeon on call. Report the following to your surgeon:  · Excessive pain, swelling, redness or odor of or around the surgical area  · Temperature over 100.5  · Nausea and vomiting lasting longer than 4 hours or if unable to take medications  · Any signs of decreased circulation or nerve impairment to extremity: change in color, persistent  numbness, tingling, coldness or increase pain  · Any questions    What to do at Home:  Recommended activity: No lifting, Driving, or Strenuous exercise for 24 hours. If you experience any of the following symptoms bleeding,swelling,acute pain or numbness, fever, please follow up with Dr. Colin De La Torre MD.    *  Please give a list of your current medications to your Primary Care Provider. *  Please update this list whenever your medications are discontinued, doses are      changed, or new medications (including over-the-counter products) are added. *  Please carry medication information at all times in case of emergency situations. These are general instructions for a healthy lifestyle:    No smoking/ No tobacco products/ Avoid exposure to second hand smoke  Surgeon General's Warning:  Quitting smoking now greatly reduces serious risk to your health.     Obesity, smoking, and sedentary lifestyle greatly increases your risk for illness    A healthy diet, regular physical exercise & weight monitoring are important for maintaining a healthy lifestyle    You may be retaining fluid if you have a history of heart failure or if you experience any of the following symptoms: Weight gain of 3 pounds or more overnight or 5 pounds in a week, increased swelling in our hands or feet or shortness of breath while lying flat in bed. Please call your doctor as soon as you notice any of these symptoms; do not wait until your next office visit. Recognize signs and symptoms of STROKE:    F-face looks uneven    A-arms unable to move or move unevenly    S-speech slurred or non-existent    T-time-call 911 as soon as signs and symptoms begin-DO NOT go       Back to bed or wait to see if you get better-TIME IS BRAIN. Warning Signs of HEART ATTACK     Call 911 if you have these symptoms:   Chest discomfort. Most heart attacks involve discomfort in the center of the chest that lasts more than a few minutes, or that goes away and comes back. It can feel like uncomfortable pressure, squeezing, fullness, or pain.  Discomfort in other areas of the upper body. Symptoms can include pain or discomfort in one or both arms, the back, neck, jaw, or stomach.  Shortness of breath with or without chest discomfort.  Other signs may include breaking out in a cold sweat, nausea, or lightheadedness. Don't wait more than five minutes to call 911 - MINUTES MATTER! Fast action can save your life. Calling 911 is almost always the fastest way to get lifesaving treatment. Emergency Medical Services staff can begin treatment when they arrive -- up to an hour sooner than if someone gets to the hospital by car. The discharge information has been reviewed with the patient. The patient verbalized understanding. Discharge medications reviewed with the patient and appropriate educational materials and side effects teaching were provided. ___________________________________________________________________________________________________________________________________                 Cardiac Catheterization/Angiography Discharge Instructions    *Check the puncture site frequently for swelling or bleeding.  If you see any bleeding, lie down and apply pressure over the area with a clean town or washcloth. Notify your doctor for any redness, swelling, drainage or oozing from the puncture site. Notify your doctor for any fever or chills. *If the leg or arm with the puncture becomes cold, numb or painful, call Dr Stephanie Workman MD at  [phone]. *Activity should be limited for the next 48 hours. Climb stairs as little as possible and avoid any stooping, bending or strenuous activity for 48 hours. No heavy lifting (anything over 10 pounds) for three days. *Do not drive for 48 hours. *You may resume your usual diet. Drink more fluids than usual.    *Have a responsible person drive you home and stay with you for at least 24 hours after your heart catheterization/angiography. *You may remove the bandage from your Right Groin in 24 hours. You may shower in 24 hours. No tub baths, hot tubs or swimming for one week. Do not place any lotions, creams, powders, ointments over the puncture site for one week. You may place a clean band-aid over the puncture site each day for 5 days. Change this daily. Patient armband removed and shredded  MyChart Activation    Thank you for requesting access to [facility]. Please follow the instructions below to securely access and download your online medical record. [facility] allows you to send messages to your doctor, view your test results, renew your prescriptions, schedule appointments, and more. How Do I Sign Up? 1. In your internet browser, go to [email or web address]. 2. Click on the First Time User? Click Here link in the Sign In box. You will see the New Member Sign Up page. 3. Enter your [facility] Access Code exactly as it appears below. You will not need to use this code after youve completed the sign-up process. If you do not sign up before the expiration date, you must request a new code.     [facility] Access Code: [email or web address]  Expires: 1/5/2019  4:24 AM (This is the date your [employer] access code will )    4. Enter the last four digits of your Social Security Number (xxxx) and Date of Birth (mm/dd/yyyy) as indicated and click Submit. You will be taken to the next sign-up page. 5. Create a MOD Systemst ID. This will be your [employer] login ID and cannot be changed, so think of one that is secure and easy to remember. 6. Create a [employer] password. You can change your password at any time. 7. Enter your Password Reset Question and Answer. This can be used at a later time if you forget your password. 8. Enter your e-mail address. You will receive e-mail notification when new information is available in [address] 9. Click Sign Up. You can now view and download portions of your medical record. 10. Click the Download Summary menu link to download a portable copy of your medical information. Additional Information    If you have questions, please visit the Frequently Asked Questions section of the [employer] website at [email or web address] Remember, [employer] is NOT to be used for urgent needs. For medical emergencies, dial 911.

## 2018-12-03 NOTE — Clinical Note
ACUTE OT Evaluation and DC same session     Therapy Evaluation: PT evaluation needed due to a decline in one or more of the following: safety, ambulation, balance, strength      Pt seen on 4EF nursing unit.                                                          Frequency Comments: d/c  OT, at or above baseline      Admitting complaint:: Allergic drug rash due to anti-infective agent [L27.0, T37.95XA]  Pneumonia of left lower lobe due to infectious organism (CMS/HCC) [J18.1]                                                                                              Co-morbidities:   Patient Active Problem List   Diagnosis   • HTN (hypertension)   • Insomnia   • Overactive bladder   • Microscopic hematuria   • Hypothyroidism   • Malignant neoplasm of nasopharynx, unspecified site   • Dysuria   • Cough   • Chronic LBP   • Primary localized osteoarthrosis, shoulder region   • Ng-Chava syndrome (CMS/HCC)   • Constipation   • Neoplastic malignant related fatigue   • Vitamin D deficiency       ASSESSMENT:  Occupational Therapy (OT) orders received due to impairments in ADL and instrumental-ADLS related to medical status.  Pt admitted from home with family for pneumonia and allergic reaction skin rash due to antibiotic.  The patient lives at home with her family who assists pt with all adls and iadls, although pt is able to compelte basic self cares at a higher level of independence when encouraged to do so.  The patient is currently functioning at moderate assist. The patient needs to be at a maximal assist level for safe return to prior living situation.  PT triaged IN due to below baseline for balance and safety with ambulation.      Task Modification: clinical decision making of low complexity, no task modification          See Flowsheet row data below for session detail and goals.     EDUCATION:  The patient and family member was educated on the role of OT in the Acute care setting. The plan of care and goals  Sheath #2: left in place. Site secured by Tegaderm. were discussed and  Verbalizes understanding and Demonstrates understanding      RECOMMENDATIONS FOR DISCHARGE:  Recommendations for Discharge: OT: Home;24 Hour assist (06/17/18 0945)    OT Identified Barriers to Discharge: none      PT/OT Mobility Equipment for Discharge: Owns 2 and 4 wheeled walker, w/c (06/17/18 1150)  PT/OT ADL Equipment for Discharge: none  (06/17/18 0945)    ICU Mobility Assesment (PERME):         PLAN: Continue skilled OT, including the following Treatment Interventions: ADL retraining;Functional transfer training;Endurance training;Patient/Family training;Compensatory technique education (06/17/18 0945)     Treatment Plan for Next Session: d/c OT                                                           SUBJECTIVE: Patient's Personal Goal: return home  (06/17/18 0945)   Subjective: agreeable to session.  close family friend, who is also an  here present for session, pt. prefers to use family friend to interpret vs.video.  per  friend, family requested this as well  (06/17/18 0945)  Subjective/Objective Comments: in chair at end of session wtih all needs in reach, chair alarm on, friend present. (06/17/18 0945)    OBJECTIVE:Basic Lines: IV (06/17/18 1150)    RN reported Crawford Fall Scale Score: 100       Last 24 hours of Functional Data     ADLs  Self Cares/ADL's  Grooming Assistance: Supervision (06/17/18 0945)  Oral Hygiene Assistance: Supervision (06/17/18 0945)  Grooming/Oral Hygiene Deficit: Supervision/Safety;Setup;Increased time to complete;Standing with assistive device (06/17/18 0945)  Bathing Assistance: Patient Refused (wants full shower later, via nursing aid as no OT is needed ) (06/17/18 0945)  Bathing Deficit:  (at home, daughter does her bating for her ) (06/17/18 0945)  Upper Body Dressing Assistance: Supervision (06/17/18 0945)  Upper Body Dressing Deficit: Setup;Supervision/Safety;Increased time to complete (06/17/18 0945)  Lower Body Clothing  Assistance: Maximal Assist (Max) (06/17/18 0945)  Footwear Assistance: Maximal Assist (Max) (06/17/18 0945)  Lower Body Dressing Deficit: Requires assistive device for steadying;Steadying;Verbal cueing;Increased time to complete;Thread RLE into pants;Thread LLE into pants;Don/doff L sock;Don/doff R sock;Pull up over hips;Don/doff L shoe;Don/doff R shoe (06/17/18 0945)  Toileting Assistance: Touching/Steadying Assistance;Minimal Assist (Min) (06/17/18 0945)  Toileting Deficit: Steadying;Increased time to complete (06/17/18 0945)  Self Cares/ADL's Comments #1: with little effort, pt. is able to demonstrate a higher level of indpeendence with basic self cares such as doffing and doffing poants and depends. however, at home pt. receives much help from daughter for this, which appears to be a cultural ideation. when encouraged to try for herself, pt is mira to do her grooing in stance at sink [facility] sup for balance and safety, able to do lower body dressing with min to mod assist and bathing with mod assist. pt. baseline is anywhere from min to max assist at home, which pt is happy with , she does not desire to do on her own  (06/17/18 0945)    Household mobility  Household Mobility  Sit to Stand: Supervision (06/17/18 0945)  Stand to Sit: Supervision (06/17/18 0945)  Stand Pivot Transfers: Touching/Steadying Assistance;Minimal Assist (Min) (balance, safety ) (06/17/18 0945)  Toilet Transfers: Touching/Steadying Assistance;Minimal Assist (Min) (balance, safety ) (06/17/18 0945)  Transfer Equipment: 2ww, gait belt (06/17/18 0945)  Sitting - Static: Independent (06/17/18 0945)  Sitting - Dynamic: Modified Independent (06/17/18 0945)  Standing - Static: Supervision;Touching/Steadying Assistance (06/17/18 0945)  Standing - Dynamic: Touching/Steadying Assistance;Minimal Assist (Min) (06/17/18 0945)  Household Mobility Comments #1: pt requires min assist for balance and safety with 2ww, pt. had 2 slight lob with light min assist  to correct.  (06/17/18 0945)    Home Management       Tolerance  OT Activity Tolerance  Activity Tolerance: 1:1 Activity to rest (06/17/18 0945)    Cognition  Communication/Cognition  Communication: Clear speech (06/17/18 0945)  Overall Cognitive Status: Within Functional Limits (06/17/18 0945)    Patient's Personal Goal: return home  (06/17/18 0945)    Therapy Goals:         Total Treatment Time:  OT Time Spent: 62 minutes (06/17/18 0945)      See OT flowsheet for full details regarding the OT therapy provided.

## 2018-12-03 NOTE — ROUTINE PROCESS
A+Ox4, right groin dressing intact, no bleeding or swelling. Discharge information reviewed. Ambulatory without difficulty. Escorted to car, tot her friend for transport.

## 2018-12-03 NOTE — PROGRESS NOTES
@[id number] TRANSFER - OUT REPORT: 
 
Verbal report given to [facility] and the [facility] on Padma Lopez  being transferred to Cath lab for ordered procedure Report consisted of patients Situation, Background, Assessment and  
Recommendations(SBAR). Information from the following report(s) SBAR, MAR and Pre Procedure Checklist was reviewed with the receiving nurse. Lines:  
Peripheral IV 12/03/18 Right Hand (Active) Peripheral IV 12/03/18 Right Antecubital (Active) Opportunity for questions and clarification was provided. Patient transported with: 
 [employer]

## 2018-12-03 NOTE — Clinical Note
TRANSFER - IN REPORT:  
 
Verbal report received from: Alexa Fontana RN. Report consisted of patient's Situation, Background, Assessment and  
Recommendations(SBAR). Opportunity for questions and clarification was provided. Assessment completed upon patient's arrival to unit and care assumed. Patient transported with a Cardiac Cath Tech / Patient Care Tech.

## 2018-12-03 NOTE — Clinical Note
Contrast Dose Calculator:  
Patient's age: 72.  
Patient's sex: Female. Patient weight (kg) = 54. Creatinine level (mg/dL) = 0.61. Creatinine clearance (mL/min): 72.11. Contrast concentration (mg/mL) = 300. MACD = 300 mL. Max Contrast dose per Creatinine Cl calculator = 162.25 mL.

## 2018-12-03 NOTE — PROGRESS NOTES
Received care of patient from Russel Montes RN. A+Ox4, c/o moderate migrian H/A. Addressed with her own Imitrex. Right groin dressing intact, no bleeding or swelling. Will be discharge after bedrest completed.

## 2018-12-03 NOTE — Clinical Note
TRANSFER - OUT REPORT:  
 
Verbal report given to: MARIZOL Garibay. Report consisted of patient's Situation, Background, Assessment and  
Recommendations(SBAR). Opportunity for questions and clarification was provided. Patient transported with a Cardiac Cath Tech / Patient Care Tech. Patient transported to: [address]

## 2018-12-03 NOTE — Clinical Note
Bilateral groin prepped with ChloraPrep and draped. Wet prep solution applied at: 1302. Wet prep solution dried at: 1305. Wet prep elapsed drying time: 3 mins.

## 2018-12-03 NOTE — PROGRESS NOTES
Report given to Anurag Mane pt without c/o at this time no distress noted cath site clean dry intact without sign of hematoma or bleeding. Friend at bedside

## 2018-12-04 NOTE — H&P
H&P update No new symptoms Risks, benefits and alternatives of right and left heart catheterization explained to patient. All questions answered

## 2018-12-06 LAB — END DIASTOLIC PRESSURE: 14

## 2018-12-31 ENCOUNTER — TELEPHONE (OUTPATIENT)
Dept: CARDIOLOGY CLINIC | Age: 73
End: 2018-12-31

## 2018-12-31 NOTE — TELEPHONE ENCOUNTER
Patient has office follow up with you on 1/8/2019 and is scheduled for mitrial valve replacement by Dr Miranda Sultana on 1/29/19 @ [facility] due you still need to see her or reschedule after surgery?

## 2019-02-14 ENCOUNTER — TELEPHONE (OUTPATIENT)
Dept: CARDIOLOGY CLINIC | Age: 74
End: 2019-02-14

## 2019-02-14 NOTE — TELEPHONE ENCOUNTER
Patient referred to [facility] for Valve replacement. Will be taking coumadin for 30 days post dishcharge, office will be tracking.

## 2019-02-25 ENCOUNTER — TELEPHONE ANTICOAG (OUTPATIENT)
Dept: CARDIOLOGY CLINIC | Age: 74
End: 2019-02-25

## 2019-02-25 LAB — INR, EXTERNAL: 3.7

## 2019-02-27 ENCOUNTER — TELEPHONE ANTICOAG (OUTPATIENT)
Dept: CARDIOLOGY CLINIC | Age: 74
End: 2019-02-27

## 2019-02-27 ENCOUNTER — TELEPHONE (OUTPATIENT)
Dept: CARDIOLOGY CLINIC | Age: 74
End: 2019-02-27

## 2019-02-27 LAB — INR, EXTERNAL: 1.8

## 2019-02-27 NOTE — TELEPHONE ENCOUNTER
Patient called and states that when she was discharged from [facility] on 2/24/19 was told to Stop Florinef, patient states that since she stopped it her heart rate is running 98 to 107 then she feels like she's going to pass out and starts vomiting and her heart rate then goes up to 149.  Please advise

## 2019-02-27 NOTE — PATIENT INSTRUCTIONS
Called and left detail voicemail on patient phone to take 2 mg daily and recheck INR on March 6.2019. If any questions to call office.

## 2019-02-28 NOTE — TELEPHONE ENCOUNTER
Called and advised patient to restart Florinef. Patient states went to [facility] ER and advised to restart Florinef.

## 2019-03-06 ENCOUNTER — TELEPHONE ANTICOAG (OUTPATIENT)
Dept: CARDIOLOGY CLINIC | Age: 74
End: 2019-03-06

## 2019-03-06 LAB — INR, EXTERNAL: 1.5

## 2019-03-08 ENCOUNTER — TELEPHONE ANTICOAG (OUTPATIENT)
Dept: CARDIOLOGY CLINIC | Age: 74
End: 2019-03-08

## 2019-03-08 NOTE — PATIENT INSTRUCTIONS
Called and spoke to patient Yayo Rani with instructions for coumadin and She voices understanding and acceptance of this advice and will call back if any further questions or concerns. Shilo Mesa

## 2019-03-14 ENCOUNTER — TELEPHONE ANTICOAG (OUTPATIENT)
Dept: CARDIOLOGY CLINIC | Age: 74
End: 2019-03-14

## 2019-03-14 LAB — INR, EXTERNAL: 2.1

## 2019-03-14 NOTE — PATIENT INSTRUCTIONS
Called and spoke to CHARLES VALENCIA [facility] regarding coumadin instructions per NP EFRAIN KHALIL [facility], resume taking 2 mg a day and recheck INR in 2 weeks. She voices understanding and acceptance of this advice and will call back if any further questions or concerns.

## 2019-03-20 ENCOUNTER — TELEPHONE ANTICOAG (OUTPATIENT)
Dept: CARDIOLOGY CLINIC | Age: 74
End: 2019-03-20

## 2019-03-20 LAB — INR, EXTERNAL: 2.3

## 2019-03-20 NOTE — PATIENT INSTRUCTIONS
Left message with [address] [place] in concern to coumadin dosing. Per NP patient is to take 2 mg daily , with an INR recheck on 4/3/19  . Patient verbalized understanding with teach back. Patient reminded if any questions or concerns to call the office.

## (undated) DEVICE — 6FR THERMODILUTION BALLOON CATHETER SWAN (ARROW)

## (undated) DEVICE — INTRODUCER SHTH 7FR CANN L11CM DIL TIP 35MM ORNG TUNGSTEN

## (undated) DEVICE — PROCEDURE KIT FLUID MGMT 10 FR CUST MAINFOLD

## (undated) DEVICE — CATHETER ANGIO 4FR L110CM S STL NYL STR PGTL W/ 6 SIDE H

## (undated) DEVICE — SET FLD ADMIN 3 W STPCOCK FIX FEM L BOR 1IN

## (undated) DEVICE — INTRODUCER SHTH 6FR CANN L11CM DIL TIP 35MM GRN TUNGSTEN

## (undated) DEVICE — CATHETER DIAG AD 4FR L100CM STD NYL JUDKINS R 4 TRULUMEN

## (undated) DEVICE — PACK PROCEDURE SURG VASC CATH 161 MMC LF

## (undated) DEVICE — PRESSURE MONITORING SET: Brand: TRUWAVE

## (undated) DEVICE — INTRODUCER SHTH 4FR CANN L11CM DIL TIP 25MM RED TUNGSTEN

## (undated) DEVICE — CATHETER ANGIO 4FR L100CM S STL NYL JL4 3 SEG BRAID SFT

## (undated) DEVICE — GUIDEWIRE VASC L150CM DIA0.025IN TIP L7CM J RAD 3MM PTFE

## (undated) DEVICE — Device